# Patient Record
Sex: FEMALE | Race: WHITE | NOT HISPANIC OR LATINO | Employment: FULL TIME | URBAN - METROPOLITAN AREA
[De-identification: names, ages, dates, MRNs, and addresses within clinical notes are randomized per-mention and may not be internally consistent; named-entity substitution may affect disease eponyms.]

---

## 2017-04-10 ENCOUNTER — ALLSCRIPTS OFFICE VISIT (OUTPATIENT)
Dept: OTHER | Facility: OTHER | Age: 27
End: 2017-04-10

## 2017-04-11 ENCOUNTER — GENERIC CONVERSION - ENCOUNTER (OUTPATIENT)
Dept: OTHER | Facility: OTHER | Age: 27
End: 2017-04-11

## 2017-12-07 ENCOUNTER — GENERIC CONVERSION - ENCOUNTER (OUTPATIENT)
Dept: OTHER | Facility: OTHER | Age: 27
End: 2017-12-07

## 2017-12-20 ENCOUNTER — ALLSCRIPTS OFFICE VISIT (OUTPATIENT)
Dept: OTHER | Facility: OTHER | Age: 27
End: 2017-12-20
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
## 2017-12-27 LAB
ADEQUACY: (HISTORICAL): NORMAL
CLINICIAN PROVIDIED ICD 9 OR 10 (HISTORICAL): NORMAL
COMMENT (HISTORICAL): NORMAL
DIAGNOSIS (HISTORICAL): NORMAL
PERFORMED BY (HISTORICAL): NORMAL
REFLEX (HISTORICAL): NORMAL
TEST INFORMATION (HISTORICAL): NORMAL

## 2017-12-28 ENCOUNTER — GENERIC CONVERSION - ENCOUNTER (OUTPATIENT)
Dept: OTHER | Facility: OTHER | Age: 27
End: 2017-12-28

## 2018-01-12 VITALS
HEART RATE: 68 BPM | WEIGHT: 127.4 LBS | SYSTOLIC BLOOD PRESSURE: 98 MMHG | DIASTOLIC BLOOD PRESSURE: 60 MMHG | TEMPERATURE: 98.8 F | BODY MASS INDEX: 21.23 KG/M2 | HEIGHT: 65 IN
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
## 2018-01-13 NOTE — MISCELLANEOUS
Message   Date: 30 Sep 2016 10:51 AM EST, Recorded By: Peri Price For: Lizabeth Madden: Hilario Claros, Self   Phone: [phone] (Home), [phone] (Work)   Reason: Renew Medication   Patient called to report BCP Rx not at pharmacy  Called [facility] with 1 refill, escript sent for balance of rx  Active Problems    1  Birth control counseling (V25 09) (Z30 9)   2  Contraception (V25 9) (Z30 9)   3  Encounter for preconception consultation (V26 49) (Z31 69)   4  Encounter for routine gynecological examination (V72 31) (Z01 419)   5  Encounter for screening for malignant neoplasm of cervix (V76 2) (Z12 4)   6  Flu vaccine need (V04 81) (Z23)   7  History of self breast exam   8  Need for diphtheria-tetanus-pertussis (Tdap) vaccine (V06 1) (Z23)   9  Nicotine dependence (305 1) (F17 200)   10  Shingles (053 9) (B02 9)    Allergies    1   No Known Drug Allergies    Plan  Contraception    · Norgestim-Eth Estrad Triphasic 0 18/0 215/0 25 MG-25 MCG Oral Tablet (Ortho  Tri-Cyclen Lo); TAKE 1 TABLET DAILY    Signatures   Electronically signed by : Sofía Lee, ; Sep 30 2016 10:52AM EST                       (Author)

## 2018-01-17 NOTE — MISCELLANEOUS
Message   Recorded as Task   Date: 09/30/2016 12:32 PM, Created By: Nikole El   Task Name: Med Renewal Request   Assigned To: Kenia Regan   Regarding Patient: Arnaldo Houston, Status: Active   CommentLuise Paci - 30 Sep 2016 12:32 PM     TASK CREATED  Patient called for BCP refill  States she is on generic Sprintec  Last office visit says Demetria Zamora  Pharmacist called and said patient is on generic Sprintec  I authorized 1 pack since patient needed it for this weekend  Is it ok to send you an escript for Sprintec for the balance of the year? Neal Guadalupe - 02 Oct 2016 6:02 PM     TASK REPLIED TO: Previously Assigned To Neal Guadalupe                      ok with me   Escript sent  Active Problems    1  Birth control counseling (V25 09) (Z30 9)   2  Contraception (V25 9) (Z30 9)   3  Encounter for preconception consultation (V26 49) (Z31 69)   4  Encounter for routine gynecological examination (V72 31) (Z01 419)   5  Encounter for screening for malignant neoplasm of cervix (V76 2) (Z12 4)   6  Flu vaccine need (V04 81) (Z23)   7  History of self breast exam   8  Need for diphtheria-tetanus-pertussis (Tdap) vaccine (V06 1) (Z23)   9  Nicotine dependence (305 1) (F17 200)   10  Shingles (053 9) (B02 9)    Current Meds   1  Sprintec 28 0 25-35 MG-MCG Oral Tablet; TAKE 1 TABLET DAILY AS DIRECTED; Therapy: 63ULV8423 to (Evaluate:75Sqz2573)  Requested for: 63TZM4060; Last   Rx:03Oct2016 Ordered    Allergies    1   No Known Drug Allergies    Signatures   Electronically signed by : Bruce Madrigal, ; Oct  3 2016  3:58PM EST                       (Author)

## 2018-01-22 VITALS
WEIGHT: 130.5 LBS | HEIGHT: 65 IN | SYSTOLIC BLOOD PRESSURE: 120 MMHG | BODY MASS INDEX: 21.74 KG/M2 | DIASTOLIC BLOOD PRESSURE: 72 MMHG
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
## 2019-01-06 PROBLEM — T23.239A: Status: ACTIVE | Noted: 2017-04-10

## 2019-01-06 RX ORDER — NORGESTIMATE AND ETHINYL ESTRADIOL 0.25-0.035
1 KIT ORAL DAILY
COMMUNITY
Start: 2016-10-03 | End: 2019-01-28 | Stop reason: SDUPTHER

## 2019-01-07 ENCOUNTER — OFFICE VISIT (OUTPATIENT)
Dept: FAMILY MEDICINE CLINIC | Facility: HOSPITAL | Age: 29
End: 2019-01-07
Payer: COMMERCIAL

## 2019-01-07 VITALS
HEART RATE: 75 BPM | TEMPERATURE: 97.6 F | BODY MASS INDEX: 22.02 KG/M2 | DIASTOLIC BLOOD PRESSURE: 78 MMHG | SYSTOLIC BLOOD PRESSURE: 122 MMHG | HEIGHT: 64 IN | WEIGHT: 129 LBS

## 2019-01-07 DIAGNOSIS — R53.83 FATIGUE, UNSPECIFIED TYPE: ICD-10-CM

## 2019-01-07 DIAGNOSIS — R94.6 ABNORMAL FINDING ON EXAMINATION OF THYROID GLAND: ICD-10-CM

## 2019-01-07 DIAGNOSIS — N92.0 MENORRHAGIA WITH REGULAR CYCLE: ICD-10-CM

## 2019-01-07 DIAGNOSIS — I87.2 VENOUS INSUFFICIENCY OF LEFT LOWER EXTREMITY: Primary | ICD-10-CM

## 2019-01-07 PROCEDURE — 99213 OFFICE O/P EST LOW 20 MIN: CPT | Performed by: FAMILY MEDICINE

## 2019-01-07 PROCEDURE — 3008F BODY MASS INDEX DOCD: CPT | Performed by: FAMILY MEDICINE

## 2019-01-07 NOTE — PROGRESS NOTES
Assessment/Plan:         Diagnoses and all orders for this visit:    Venous insufficiency of left lower extremity  -     VAS lower limb venous duplex study, complete bilateral; Future    Menorrhagia with regular cycle  -     Cancel: CBC and differential; Future  -     Cancel: Comprehensive metabolic panel; Future  -     CBC and differential; Future  -     CBC and differential    Abnormal finding on examination of thyroid gland  -     Cancel: TSH, 3rd generation; Future  -     TSH, 3rd generation; Future  -     TSH, 3rd generation    Fatigue, unspecified type  -     Cancel: Vitamin B12; Future  -     Cancel: Magnesium; Future  -     Comprehensive metabolic panel; Future  -     Vitamin B12; Future  -     Magnesium; Future  -     Comprehensive metabolic panel  -     Vitamin B12  -     Magnesium          Subjective:      Patient ID: Annel Morrison is a 33 y o  female  Uncomfortable swelling in veins of LLE, worse with extended time on her feet  Works long hours in oral surgeons office  Heavy periods despite use of Sprintec        The following portions of the patient's history were reviewed and updated as appropriate: allergies, current medications, past family history, past medical history, past social history, past surgical history and problem list     Review of Systems   Cardiovascular: Positive for leg swelling  Musculoskeletal: Positive for myalgias  Neurological: Negative  Hematological: Negative  All other systems reviewed and are negative  Objective:      /78   Pulse 75   Temp 97 6 °F (36 4 °C)   Ht 5' 4" (1 626 m)   Wt 58 5 kg (129 lb)   BMI 22 14 kg/m²          Physical Exam   Constitutional: She is oriented to person, place, and time  Neck: Carotid bruit is not present  Thyromegaly present  Musculoskeletal:   Venous varicosities posterior LLE popliteal   Neurological: She is oriented to person, place, and time

## 2019-01-10 LAB
ALBUMIN SERPL-MCNC: 4.6 G/DL (ref 3.5–5.5)
ALBUMIN/GLOB SERPL: 1.6 {RATIO} (ref 1.2–2.2)
ALP SERPL-CCNC: 59 IU/L (ref 39–117)
ALT SERPL-CCNC: 10 IU/L (ref 0–32)
AST SERPL-CCNC: 15 IU/L (ref 0–40)
BASOPHILS # BLD AUTO: 0 X10E3/UL (ref 0–0.2)
BASOPHILS NFR BLD AUTO: 0 %
BILIRUB SERPL-MCNC: 0.8 MG/DL (ref 0–1.2)
BUN SERPL-MCNC: 10 MG/DL (ref 6–20)
BUN/CREAT SERPL: 11 (ref 9–23)
CALCIUM SERPL-MCNC: 9.6 MG/DL (ref 8.7–10.2)
CHLORIDE SERPL-SCNC: 100 MMOL/L (ref 96–106)
CO2 SERPL-SCNC: 22 MMOL/L (ref 20–29)
CREAT SERPL-MCNC: 0.87 MG/DL (ref 0.57–1)
EOSINOPHIL # BLD AUTO: 0.2 X10E3/UL (ref 0–0.4)
EOSINOPHIL NFR BLD AUTO: 3 %
ERYTHROCYTE [DISTWIDTH] IN BLOOD BY AUTOMATED COUNT: 13.3 % (ref 12.3–15.4)
GLOBULIN SER-MCNC: 2.8 G/DL (ref 1.5–4.5)
GLUCOSE SERPL-MCNC: 82 MG/DL (ref 65–99)
HCT VFR BLD AUTO: 39.2 % (ref 34–46.6)
HGB BLD-MCNC: 12.9 G/DL (ref 11.1–15.9)
IMM GRANULOCYTES # BLD: 0 X10E3/UL (ref 0–0.1)
IMM GRANULOCYTES NFR BLD: 0 %
LYMPHOCYTES # BLD AUTO: 2.3 X10E3/UL (ref 0.7–3.1)
LYMPHOCYTES NFR BLD AUTO: 42 %
MAGNESIUM SERPL-MCNC: 1.9 MG/DL (ref 1.6–2.3)
MCH RBC QN AUTO: 31.5 PG (ref 26.6–33)
MCHC RBC AUTO-ENTMCNC: 32.9 G/DL (ref 31.5–35.7)
MCV RBC AUTO: 96 FL (ref 79–97)
MONOCYTES # BLD AUTO: 0.3 X10E3/UL (ref 0.1–0.9)
MONOCYTES NFR BLD AUTO: 5 %
NEUTROPHILS # BLD AUTO: 2.8 X10E3/UL (ref 1.4–7)
NEUTROPHILS NFR BLD AUTO: 50 %
PLATELET # BLD AUTO: 306 X10E3/UL (ref 150–379)
POTASSIUM SERPL-SCNC: 4.3 MMOL/L (ref 3.5–5.2)
PROT SERPL-MCNC: 7.4 G/DL (ref 6–8.5)
RBC # BLD AUTO: 4.09 X10E6/UL (ref 3.77–5.28)
SL AMB EGFR AFRICAN AMERICAN: 104 ML/MIN/1.73
SL AMB EGFR NON AFRICAN AMERICAN: 90 ML/MIN/1.73
SODIUM SERPL-SCNC: 138 MMOL/L (ref 134–144)
TSH SERPL DL<=0.005 MIU/L-ACNC: 1.35 UIU/ML (ref 0.45–4.5)
VIT B12 SERPL-MCNC: 441 PG/ML (ref 232–1245)
WBC # BLD AUTO: 5.5 X10E3/UL (ref 3.4–10.8)

## 2019-01-16 ENCOUNTER — HOSPITAL ENCOUNTER (OUTPATIENT)
Dept: NON INVASIVE DIAGNOSTICS | Facility: HOSPITAL | Age: 29
Discharge: HOME | End: 2019-01-16
Payer: COMMERCIAL

## 2019-01-16 DIAGNOSIS — I87.2 VENOUS INSUFFICIENCY OF LEFT LOWER EXTREMITY: ICD-10-CM

## 2019-01-16 PROCEDURE — 93970 EXTREMITY STUDY: CPT

## 2019-01-16 PROCEDURE — 93970 EXTREMITY STUDY: CPT | Performed by: SURGERY

## 2019-01-28 DIAGNOSIS — Z30.41 ENCOUNTER FOR SURVEILLANCE OF CONTRACEPTIVE PILLS: Primary | ICD-10-CM

## 2019-01-28 RX ORDER — NORGESTIMATE AND ETHINYL ESTRADIOL 0.25-0.035
1 KIT ORAL DAILY
Qty: 28 TABLET | Refills: 1 | Status: SHIPPED | OUTPATIENT
Start: 2019-01-28 | End: 2019-02-03 | Stop reason: SDUPTHER

## 2019-01-28 NOTE — TELEPHONE ENCOUNTER
----- Message from Eliecer Mckeon sent at 1/28/2019 12:57 PM EST -----  Hi the patient mentioned above would like a refill on her birth control pill sprintec  The [facility]  She is scheduled to see Dr Bo Patel on 02/20/2019  Thank you

## 2019-02-03 DIAGNOSIS — Z30.41 ENCOUNTER FOR SURVEILLANCE OF CONTRACEPTIVE PILLS: ICD-10-CM

## 2019-02-20 ENCOUNTER — ANNUAL EXAM (OUTPATIENT)
Dept: OBGYN CLINIC | Facility: CLINIC | Age: 29
End: 2019-02-20
Payer: COMMERCIAL

## 2019-02-20 VITALS
DIASTOLIC BLOOD PRESSURE: 72 MMHG | HEIGHT: 65 IN | WEIGHT: 126.6 LBS | SYSTOLIC BLOOD PRESSURE: 112 MMHG | BODY MASS INDEX: 21.09 KG/M2

## 2019-02-20 DIAGNOSIS — Z01.419 WOMEN'S ANNUAL ROUTINE GYNECOLOGICAL EXAMINATION: Primary | ICD-10-CM

## 2019-02-20 DIAGNOSIS — Z30.41 ENCOUNTER FOR SURVEILLANCE OF CONTRACEPTIVE PILLS: ICD-10-CM

## 2019-02-20 PROCEDURE — 99395 PREV VISIT EST AGE 18-39: CPT | Performed by: OBSTETRICS & GYNECOLOGY

## 2019-02-20 RX ORDER — NORGESTIMATE AND ETHINYL ESTRADIOL 0.25-0.035
1 KIT ORAL DAILY
Qty: 84 TABLET | Refills: 3 | Status: SHIPPED | OUTPATIENT
Start: 2019-02-20 | End: 2020-01-25

## 2019-02-20 NOTE — PROGRESS NOTES
Assessment/Plan   Diagnoses and all orders for this visit:    Women's annual routine gynecological examination    Encounter for surveillance of contraceptive pills  -     norgestimate-ethinyl estradiol ([id number] [id number]) 0 25-35 MG-MCG per tablet; Take 1 tablet by mouth daily     1  Yearly exam-Pap smear deferred, self-breast awareness reviewed  2  Contraception-patient is doing well on Sprintec  She wishes to continue with this  Electronic prescription for 3 packs refill 3 was sent to [facility] pharmacy  3  Family history of breast cancer-patient mother and grandmother diagnosed with breast cancer  Her mother was diagnosed in her 46s  They both go to the same oncologist and are stable without clinical recurrence  Mom was tested and had BRCA testing which was reportedly negative  Would recommend start mammograms age 35  She will continue with self-breast exams and call with any issues  4  Thyroid-patient does have top-normal thyroid  She continues to follow-up with her doctor  Recent TSH was normal January 2019   5  Other-patient considering a pregnancy in the next few years  She will contact me with any pre conceptual concerns  Otherwise, she will follow up in 1 year or as needed  Subjective   Patient ID: Raheem Strickland is a 33 y o  female  Vitals:    02/20/19 0920   BP: 112/72     Patient was seen today for yearly exam   Please see assessment plan for details        The following portions of the patient's history were reviewed and updated as appropriate: allergies, current medications, past family history, past medical history, past social history, past surgical history and problem list   Past Medical History:   Diagnosis Date    Abnormal finding on examination of thyroid gland     Resolved 10/7/2015     Abnormal Pap smear of cervix     Fibroadenoma of breast     Menorrhagia     Resolved 8/29/2016     Methicillin resistant Staphylococcus aureus infection     Mild cervical dysplasia      Past Surgical History:   Procedure Laterality Date    BREAST SURGERY Right     Lumpectomy     COLPOSCOPY W/ BIOPSY / CURETTAGE  2007    FIDENCIO-1 noted at 12, 5, and 7 o'clock with the ECC negative  Repeat colposcopy 2009 with FIDENCIO-1 at 12 oclock, and negative biopsies at 2 and 6 o'clock and ECC       TOOTH EXTRACTION       OB History    Para Term  AB Living   1 1 1     1   SAB TAB Ectopic Multiple Live Births           1      # Outcome Date GA Lbr Ar/2nd Weight Sex Delivery Anes PTL Lv   1 Term                Current Outpatient Medications:     norgestimate-ethinyl estradiol (SPRINTEC 28) 0 25-35 MG-MCG per tablet, Take 1 tablet by mouth daily, Disp: 84 tablet, Rfl: 3  No Known Allergies  Social History     Socioeconomic History    Marital status: Single     Spouse name: None    Number of children: 1    Years of education: None    Highest education level: None   Occupational History    None   Social Needs    Financial resource strain: None    Food insecurity:     Worry: None     Inability: None    Transportation needs:     Medical: None     Non-medical: None   Tobacco Use    Smoking status: Former Smoker    Smokeless tobacco: Current User   Substance and Sexual Activity    Alcohol use: Yes     Comment: Socially     Drug use: No    Sexual activity: Yes     Birth control/protection: OCP   Lifestyle    Physical activity:     Days per week: None     Minutes per session: None    Stress: None   Relationships    Social connections:     Talks on phone: None     Gets together: None     Attends Orthodox service: None     Active member of club or organization: None     Attends meetings of clubs or organizations: None     Relationship status: None    Intimate partner violence:     Fear of current or ex partner: None     Emotionally abused: None     Physically abused: None     Forced sexual activity: None   Other Topics Concern    None   Social History Narrative    Always uses seat belt    Caffeine use    [address] ([employer])     Family History   Problem Relation Age of Onset    Breast cancer Mother     Breast cancer Maternal Grandmother     Heart disease Maternal Grandfather     Diabetes Maternal Grandfather     Lung cancer Paternal Grandfather     Osteoporosis Family     Rectal cancer Family        Review of Systems   Constitutional: Negative for chills, diaphoresis, fatigue and fever  Respiratory: Negative for apnea, cough, chest tightness, shortness of breath and wheezing  Cardiovascular: Negative for chest pain, palpitations and leg swelling  Gastrointestinal: Negative for abdominal distention, abdominal pain, anal bleeding, constipation, diarrhea, nausea, rectal pain and vomiting  Genitourinary: Negative for difficulty urinating, dyspareunia, dysuria, frequency, hematuria, menstrual problem, pelvic pain, urgency, vaginal bleeding, vaginal discharge and vaginal pain  Musculoskeletal: Negative for arthralgias, back pain and myalgias  Skin: Negative for color change and rash  Neurological: Negative for dizziness, syncope, light-headedness, numbness and headaches  Hematological: Negative for adenopathy  Does not bruise/bleed easily  Psychiatric/Behavioral: Negative for dysphoric mood and sleep disturbance  The patient is not nervous/anxious  Objective   Physical Exam    Objective      /72 (BP Location: Left arm, Patient Position: Sitting, Cuff Size: Standard)   Ht 5' 5" (1 651 m)   Wt 57 4 kg (126 lb 9 6 oz)   LMP 02/03/2019   BMI 21 07 kg/m²     General:   alert and oriented, in no acute distress   Neck: normal to inspection and palpation   Breast: normal appearance, no masses or tenderness   Heart:    Lungs:    Abdomen: soft, non-tender, without masses or organomegaly   Vulva: normal   Vagina: Without erythema or lesions or discharge  Normal   Cervix: Without lesions or discharge or cervicitis    No Cervical motion tenderness   Uterus: top normal size, anteverted, non-tender   Adnexa: no mass, fullness, tenderness   Rectum: negative    Psych:  Normal mood and affect   Skin:  Without obvious lesions   Eyes: symmetric, with normal movements and reactivity   Musculoskeletal:  Normal muscle tone and movements appreciated

## 2019-02-20 NOTE — PATIENT INSTRUCTIONS
Birth Control Pills   WHAT YOU NEED TO KNOW:   What are birth control pills? Birth control pills are also called oral contraceptives, or the pill  It is medicine that helps prevent pregnancy  Birth control pills work by preventing ovulation  Ovulation is when the ovaries make and release an egg cell each month  If this egg gets fertilized by sperm, pregnancy occurs  Birth control pills may also help to prevent pregnancy by keeping sperm from fertilizing an egg  What may be done before I can start taking birth control pills? You need to see your healthcare provider to get a prescription  Any of the following may be done before your healthcare provider gives you a prescription:  · Your healthcare provider will ask you about diseases and illnesses you have had in the past  He will check your risk for blood clots, heart conditions, or stroke  He will also check your blood pressure, and may do a breast and pelvic exam  A Pap smear may also be done during the pelvic exam  This is a test to make sure you do not have abnormal changes on your cervix  You may need other tests, such as a urine test, to make sure you are not pregnant  · Your healthcare provider will ask if you take any medicines and if you smoke  Smoking increases your risk for stroke, heart attack, or a blood clot in your lungs  If you smoke, you should not take certain kinds of birth control pills  What are the advantages of birth control pills? When birth control pills are used correctly, the chances of getting pregnant are very low  Birth control pills may help decrease bleeding and pain during your monthly period  They may also help prevent cancer of the uterus and ovaries  What are the disadvantages of birth control pills? You may have sudden changes in your mood or feelings while you take birth control pills  You may have nausea and decreased sex drive  You may have an increased appetite and rapid weight gain   You may also have bleeding in between periods, less frequent periods, vaginal dryness, and breast pain  Birth control pills will not protect you from sexually transmitted infections  Rarely, some birth control pills can increase your risk for a blood clot  This may become life-threatening  What should I do if I decide I want to get pregnant? If you are planning to have a baby, ask your healthcare provider when you may stop taking your birth control pills  It may take some time for you to start ovulating again  Ask your healthcare provider for more information about pregnancy after birth control pills  When should I start taking birth control pills after I have a baby? If you are not breastfeeding, you may start taking birth control pills 3 weeks after you give birth  You may be able to take certain types of birth control pills if you are breastfeeding  These pills can be started from 6 weeks to 6 months after you give birth  Ask your healthcare provider for more information about when to start taking birth control pills after you give birth  When should I contact my healthcare provider? · You have forgotten to take a birth control pill  · You have mood changes, such as depression, since starting birth control pills  · You have nausea or you are vomiting  · You have severe abdominal pain  · You missed a period and have questions or concerns about being pregnant  · You still have bleeding 4 months after taking birth control pills correctly  · You have questions or concerns about your condition or care  When should I seek immediate care or call 911? · Your arm or leg feels warm, tender, and painful  It may look swollen and red  · You feel lightheaded, short of breath, and have chest pain  · You cough up blood      · You have any of the following signs of a stroke:      ¨ Numbness or drooping on one side of your face     ¨ Weakness in an arm or leg    ¨ Confusion or difficulty speaking    ¨ Dizziness, a severe headache, or vision loss    · You have severe pain, numbness, or swelling in your arms or legs  CARE AGREEMENT:   You have the right to help plan your care  Learn about your health condition and how it may be treated  Discuss treatment options with your caregivers to decide what care you want to receive  You always have the right to refuse treatment  The above information is an  only  It is not intended as medical advice for individual conditions or treatments  Talk to your doctor, nurse or pharmacist before following any medical regimen to see if it is safe and effective for you  © 2017 [facility] Information is for End User's use only and may not be sold, redistributed or otherwise used for commercial purposes  All illustrations and images included in CareNotes® are the copyrighted property of A D A M [employer] Inc  or Devin Herrera

## 2019-07-23 ENCOUNTER — OFFICE VISIT (OUTPATIENT)
Dept: FAMILY MEDICINE CLINIC | Facility: HOSPITAL | Age: 29
End: 2019-07-23
Payer: COMMERCIAL

## 2019-07-23 VITALS
DIASTOLIC BLOOD PRESSURE: 60 MMHG | TEMPERATURE: 97.6 F | SYSTOLIC BLOOD PRESSURE: 114 MMHG | HEART RATE: 76 BPM | HEIGHT: 65 IN | BODY MASS INDEX: 21.29 KG/M2 | WEIGHT: 127.8 LBS

## 2019-07-23 DIAGNOSIS — B02.9 HERPES ZOSTER WITHOUT COMPLICATION: Primary | ICD-10-CM

## 2019-07-23 PROCEDURE — 1036F TOBACCO NON-USER: CPT | Performed by: NURSE PRACTITIONER

## 2019-07-23 PROCEDURE — 3008F BODY MASS INDEX DOCD: CPT | Performed by: NURSE PRACTITIONER

## 2019-07-23 PROCEDURE — 99213 OFFICE O/P EST LOW 20 MIN: CPT | Performed by: NURSE PRACTITIONER

## 2019-07-23 RX ORDER — VALACYCLOVIR HYDROCHLORIDE 1 G/1
1000 TABLET, FILM COATED ORAL 2 TIMES DAILY
Qty: 14 TABLET | Refills: 0 | Status: SHIPPED | OUTPATIENT
Start: 2019-07-23 | End: 2019-07-30

## 2019-07-23 NOTE — PROGRESS NOTES
Assessment/Plan:   Unlikely poisson ivy given non itchy  Pain symptoms consistent with shingles and given history will treat as such  Instructed to call with increasing erythema, fever,chills  Diagnoses and all orders for this visit:    Herpes zoster without complication  -     valACYclovir (VALTREX) 1,000 mg tablet; Take 1 tablet (1,000 mg total) by mouth 2 (two) times a day for 7 days          Subjective:     Patient ID: Monroe Kulkarni is a 26 y o  female  Rash on right low leg started 3 days ago  Had swelling  Not itchy  Burning and tingling that goes down foot and up calf  [place]  No fever, chills  Had shingles a few years ago and pain is similar  Does not recall any bug bite  Review of Systems   Constitutional: Negative for chills and fever  Cardiovascular: Positive for leg swelling  Skin: Positive for rash  The following portions of the patient's history were reviewed and updated as appropriate: allergies, current medications, past family history, past medical history, past social history, past surgical history and problem list     Objective:  Vitals:    07/23/19 0731   BP: 114/60   Pulse: 76   Temp: 97 6 °F (36 4 °C)      Physical Exam   Constitutional: She appears well-developed and well-nourished  Cardiovascular: Normal rate, regular rhythm and normal heart sounds  Pulmonary/Chest: Effort normal and breath sounds normal    Skin: Skin is warm and dry  Rash noted  Right posterior low leg with 1 cm red papule w/o surrounding erythema  Not vesicular  Some tenderness to touch  No swelling noted  Psychiatric: She has a normal mood and affect

## 2019-09-25 ENCOUNTER — OFFICE VISIT (OUTPATIENT)
Dept: OBGYN CLINIC | Facility: CLINIC | Age: 29
End: 2019-09-25
Payer: COMMERCIAL

## 2019-09-25 VITALS — BODY MASS INDEX: 21.13 KG/M2 | SYSTOLIC BLOOD PRESSURE: 128 MMHG | WEIGHT: 127 LBS | DIASTOLIC BLOOD PRESSURE: 84 MMHG

## 2019-09-25 DIAGNOSIS — D24.1 FIBROADENOMA OF RIGHT BREAST: Primary | ICD-10-CM

## 2019-09-25 DIAGNOSIS — Z30.41 ENCOUNTER FOR SURVEILLANCE OF CONTRACEPTIVE PILLS: ICD-10-CM

## 2019-09-25 DIAGNOSIS — N63.25 BREAST LUMP ON LEFT SIDE AT 6 O'CLOCK POSITION: ICD-10-CM

## 2019-09-25 PROCEDURE — 99214 OFFICE O/P EST MOD 30 MIN: CPT | Performed by: OBSTETRICS & GYNECOLOGY

## 2019-09-25 NOTE — PATIENT INSTRUCTIONS
Breast Mass   WHAT YOU NEED TO KNOW:   What do I need to know about a breast mass? A breast mass is a lump or growth in your breast or underarm  A cyst (fluid-filled pocket), an injury, or changes in your breast tissue are the most common causes  A breast mass can also be caused by cancer  You must follow up as directed to find the cause of your breast mass  How is a breast mass evaluated? Your healthcare provider will perform a breast exam to feel the mass  Tell him when you noticed the breast mass, and if it has changed in size  Tell him if you have any other symptoms, such as pain, fever, or nipple discharge  He will ask if you have a personal or family history of breast disease or cancer  He will also ask if you had an injury, biopsy, or surgery on your breast   · Aspiration  is a procedure used to withdraw fluid or cells from your breast mass to be tested for cancer  · A mammogram  is an x-ray to closely examine your breast mass  Healthcare providers will also look for other lumps or tissue changes in your breast      · An ultrasound  uses sound waves to look for a cyst or tumor  Why is it important to continue breast self-exams? Check your breast for changes in size, shape, or feel of the breast tissue  Check under your arms and all around your breasts  If you have monthly periods, examine your breasts after your period is over  Contact your healthcare provider if you notice any changes in your breasts  If you have questions, ask for more information on how to do a breast self-exam         When should I contact my healthcare provider? · Your breast mass returns or grows larger  · You have pain in your breast or underarm  · You have nipple discharge  · You notice other changes to your breasts or nipples, such as dimpling or a rash  · You had an aspiration and you have redness, pain, swelling, or warmth near the aspiration site  · You have a fever       · You have questions or concerns about your condition or care  CARE AGREEMENT:   You have the right to help plan your care  Learn about your health condition and how it may be treated  Discuss treatment options with your caregivers to decide what care you want to receive  You always have the right to refuse treatment  The above information is an  only  It is not intended as medical advice for individual conditions or treatments  Talk to your doctor, nurse or pharmacist before following any medical regimen to see if it is safe and effective for you  © 2017 [address] Information is for End User's use only and may not be sold, redistributed or otherwise used for commercial purposes  All illustrations and images included in CareNotes® are the copyrighted property of [employer]  or Devin Herrera

## 2019-09-25 NOTE — PROGRESS NOTES
Assessment/Plan   Diagnoses and all orders for this visit:    Fibroadenoma of right breast  -     Mammo diagnostic bilateral w cad; Future  -     US BREAST BILATERAL LIMITED (DIAGNOSTIC); Future    Breast lump on left side at 6 o'clock position  -     Mammo diagnostic bilateral w cad; Future  -     US BREAST BILATERAL LIMITED (DIAGNOSTIC); Future     1  Left breast lump-patient noted this yesterday evening  On exam, 1 cm fullness noted at 6 o'clock left breast, approximately 2 cm above the inferior border of the breast   It is mobile, well-circumscribed, nontender without any erythema or warmth or discharge noted  The patient was concerned about this given strong family history and wish to have aggressive evaluation  She was scheduled for diagnostic mammogram/ultrasound  She will follow-up in 1 month time for breast check  2  History of right breast fibroadenoma-noted at 8 o'clock in January 2011  She is status post excision by Dr Radha Rubin at [facility] with benign findings  Given this history, she will have bilateral diagnostic mammogram with bilateral ultrasound  3  Family history of breast cancer-mother diagnosed in her 46s with negative genetic testing by report  Grandmother diagnosed in her [de-identified]  Given all this, we will proceed with imaging as noted above  4  Top-normal thyroid-patient continues to have follow-up with treating doctor as needed  5  Contraception-continue Sprintec  Follow-up 1 month time for breast check or as needed  Subjective   Patient ID: Nolvia Winslow is a 26 y o  female  Vitals:    09/25/19 1243   BP: 128/84     Patient was seen today for breast check  Please see assessment plan for details        The following portions of the patient's history were reviewed and updated as appropriate: allergies, current medications, past family history, past medical history, past social history, past surgical history and problem list   Past Medical History:   Diagnosis Date  Abnormal finding on examination of thyroid gland     Resolved 10/7/2015     Abnormal Pap smear of cervix     Fibroadenoma of breast     Menorrhagia     Resolved 2016     Methicillin resistant Staphylococcus aureus infection     Mild cervical dysplasia      Past Surgical History:   Procedure Laterality Date    BREAST SURGERY Right     Lumpectomy     COLPOSCOPY W/ BIOPSY / CURETTAGE  2007    FIDENCIO-1 noted at 12, 5, and 7 o'clock with the ECC negative  Repeat colposcopy 2009 with FIDENCIO-1 at 12 oclock, and negative biopsies at 2 and 6 o'clock and ECC       TOOTH EXTRACTION       OB History    Para Term  AB Living   1 1 1     1   SAB TAB Ectopic Multiple Live Births           1      # Outcome Date GA Lbr Ar/2nd Weight Sex Delivery Anes PTL Lv   1 Term                Current Outpatient Medications:     norgestimate-ethinyl estradiol (SPRINTEC 28) 0 25-35 MG-MCG per tablet, Take 1 tablet by mouth daily, Disp: 84 tablet, Rfl: 3    valACYclovir (VALTREX) 1,000 mg tablet, Take 1 tablet (1,000 mg total) by mouth 2 (two) times a day for 7 days, Disp: 14 tablet, Rfl: 0  No Known Allergies  Social History     Socioeconomic History    Marital status: Single     Spouse name: None    Number of children: 1    Years of education: None    Highest education level: None   Occupational History    None   Social Needs    Financial resource strain: None    Food insecurity:     Worry: None     Inability: None    Transportation needs:     Medical: None     Non-medical: None   Tobacco Use    Smoking status: Former Smoker    Smokeless tobacco: Former User   Substance and Sexual Activity    Alcohol use: Yes     Comment: Socially     Drug use: No    Sexual activity: Yes     Birth control/protection: OCP   Lifestyle    Physical activity:     Days per week: None     Minutes per session: None    Stress: None   Relationships    Social connections:     Talks on phone: None     Gets together: None Attends Religion service: None     Active member of club or organization: None     Attends meetings of clubs or organizations: None     Relationship status: None    Intimate partner violence:     Fear of current or ex partner: None     Emotionally abused: None     Physically abused: None     Forced sexual activity: None   Other Topics Concern    None   Social History Narrative    Always uses seat belt    Caffeine use    [employer] ([employer])     Family History   Problem Relation Age of Onset    Breast cancer Mother     Breast cancer Maternal Grandmother     Heart disease Maternal Grandfather     Diabetes Maternal Grandfather     Lung cancer Paternal Grandfather     Osteoporosis Family     Rectal cancer Family        Review of Systems   Constitutional: Negative for chills, diaphoresis, fatigue and fever  Respiratory: Negative for apnea, cough, chest tightness, shortness of breath and wheezing  Cardiovascular: Negative for chest pain, palpitations and leg swelling  Gastrointestinal: Negative for abdominal distention, abdominal pain, anal bleeding, constipation, diarrhea, nausea, rectal pain and vomiting  Genitourinary: Negative for difficulty urinating, dyspareunia, dysuria, frequency, hematuria, menstrual problem, pelvic pain, urgency, vaginal bleeding, vaginal discharge and vaginal pain  Musculoskeletal: Negative for arthralgias, back pain and myalgias  Skin: Negative for color change and rash  Neurological: Negative for dizziness, syncope, light-headedness, numbness and headaches  Hematological: Negative for adenopathy  Does not bruise/bleed easily  Psychiatric/Behavioral: Negative for dysphoric mood and sleep disturbance  The patient is not nervous/anxious       Breast lump    Objective   Physical Exam    Objective      /84 (BP Location: Right arm, Patient Position: Sitting, Cuff Size: Standard)   Wt 57 6 kg (127 lb)   LMP 09/13/2019   BMI 21 13 kg/m² General:   alert and oriented, in no acute distress   Neck: normal to inspection and palpation   Breast: Fibrocystic changes noted bilaterally  Right breast without any palpable masses noted    Left breast with approximately 1 cm fullness noted at 6 o'clock   Heart:    Lungs:    Abdomen: soft, non-tender, without masses or organomegaly   Vulva:    Vagina:    Cervix:    Uterus:    Adnexa:    Rectum:     Psych:  Normal mood and affect   Skin:  Without obvious lesions   Eyes: symmetric, with normal movements and reactivity   Musculoskeletal:  Normal muscle tone and movements appreciated

## 2019-10-02 DIAGNOSIS — N63.0 BREAST LUMP: Primary | ICD-10-CM

## 2020-01-25 DIAGNOSIS — Z30.41 ENCOUNTER FOR SURVEILLANCE OF CONTRACEPTIVE PILLS: ICD-10-CM

## 2020-02-26 ENCOUNTER — ANNUAL EXAM (OUTPATIENT)
Dept: OBGYN CLINIC | Facility: CLINIC | Age: 30
End: 2020-02-26
Payer: COMMERCIAL

## 2020-02-26 VITALS
SYSTOLIC BLOOD PRESSURE: 116 MMHG | BODY MASS INDEX: 21.66 KG/M2 | WEIGHT: 130 LBS | HEIGHT: 65 IN | DIASTOLIC BLOOD PRESSURE: 78 MMHG

## 2020-02-26 DIAGNOSIS — Z12.4 SCREENING FOR CERVICAL CANCER: ICD-10-CM

## 2020-02-26 DIAGNOSIS — N63.25 BREAST LUMP ON LEFT SIDE AT 6 O'CLOCK POSITION: ICD-10-CM

## 2020-02-26 DIAGNOSIS — Z11.51 SCREENING FOR HPV (HUMAN PAPILLOMAVIRUS): ICD-10-CM

## 2020-02-26 DIAGNOSIS — Z01.419 WOMEN'S ANNUAL ROUTINE GYNECOLOGICAL EXAMINATION: Primary | ICD-10-CM

## 2020-02-26 DIAGNOSIS — N60.11 FIBROCYSTIC CHANGES OF RIGHT BREAST: ICD-10-CM

## 2020-02-26 DIAGNOSIS — Z30.41 ENCOUNTER FOR SURVEILLANCE OF CONTRACEPTIVE PILLS: ICD-10-CM

## 2020-02-26 PROCEDURE — G0145 SCR C/V CYTO,THINLAYER,RESCR: HCPCS | Performed by: OBSTETRICS & GYNECOLOGY

## 2020-02-26 PROCEDURE — 99395 PREV VISIT EST AGE 18-39: CPT | Performed by: OBSTETRICS & GYNECOLOGY

## 2020-02-26 PROCEDURE — 87624 HPV HI-RISK TYP POOLED RSLT: CPT | Performed by: OBSTETRICS & GYNECOLOGY

## 2020-02-26 PROCEDURE — 3008F BODY MASS INDEX DOCD: CPT | Performed by: OBSTETRICS & GYNECOLOGY

## 2020-02-26 RX ORDER — NORGESTIMATE AND ETHINYL ESTRADIOL 0.25-0.035
1 KIT ORAL DAILY
Qty: 84 TABLET | Refills: 3 | Status: SHIPPED | OUTPATIENT
Start: 2020-02-26 | End: 2021-03-16 | Stop reason: SDUPTHER

## 2020-02-26 NOTE — PROGRESS NOTES
Assessment/Plan   Diagnoses and all orders for this visit:    Women's annual routine gynecological examination    Screening for cervical cancer  -     Liquid-based pap, screening    Screening for HPV (human papillomavirus)  -     Liquid-based pap, screening    Breast lump on left side at 6 o'clock position  -     US BREAST BILATERAL LIMITED (DIAGNOSTIC); Future    Fibrocystic changes of right breast  -     US BREAST BILATERAL LIMITED (DIAGNOSTIC); Future    Encounter for surveillance of contraceptive pills  -     norgestimate-ethinyl estradiol (Sprintec 28) 0 25-35 MG-MCG per tablet; Take 1 tablet by mouth daily     1  Yearly exam-Pap smear done with HPV testing, self-breast awareness review  2  Contraception-patient continues Sprintec or its equivalent  Electronic prescription for 3 packs refill 3 was sent to [facility] at her request   3  Left breast lump-noted at 0600 hours  Patient seen initially 9/25/19 for this had ultrasound demonstrating 1 cm subdermal finding consistent with fibroadenoma with recommendations for follow-up in 6 months time  Request was given for bilateral breast ultrasound and this is scheduled at [facility] April 2020  Would suggest she might follow up with her breast specialist after that for further management  4  Right breast fibroadenoma-noted 0800 hours of right breast January 2011, status post excision by Dr Perez Irwin at [facility] with benign findings  Exam today with fibrocystic changes noted with no dominant mass appreciated  We will obtain right breast ultrasound in addition to the left breast ultrasound as noted above, thus bilateral breast ultrasound was ordered  5  Family history of breast cancer-mother and maternal grandmother both diagnosed with breast cancer  Mother was diagnosed in her 46s with negative genetic testing by report  Grandmother was diagnosed in her [de-identified]    6  History of top-normal thyroid-to follow-up with primary doctor as recommended by them   TSH 2019 was normal   7  Family history of rectal cancer-maternal aunt recently diagnosed with stage IV colon cancer  Her cousin, the daughter of this maternal aunt, had rectal cancer diagnosed at age 27  We again discussed genetic testing including evaluation for Sanchez syndrome  Would suggest she discuss with her family regarding this issue about testing  Otherwise, follow-up 1 year for yearly exam or as needed  Subjective   Patient ID: Paula Parson is a 26 y o  female  Vitals:    20 [phone]   BP: 116/78     Patient was seen today for yearly exam   Please see assessment plan for details  The following portions of the patient's history were reviewed and updated as appropriate: allergies, current medications, past family history, past medical history, past social history, past surgical history and problem list   Past Medical History:   Diagnosis Date    Abnormal finding on examination of thyroid gland     Resolved 10/7/2015     Abnormal Pap smear of cervix     Fibroadenoma of breast     Menorrhagia     Resolved 2016     Methicillin resistant Staphylococcus aureus infection     Mild cervical dysplasia      Past Surgical History:   Procedure Laterality Date    BREAST SURGERY Right     Lumpectomy     COLPOSCOPY W/ BIOPSY / CURETTAGE  2007    FIDENCIO-1 noted at 12, 5, and 7 o'clock with the ECC negative  Repeat colposcopy 2009 with FIDENCIO-1 at 12 oclock, and negative biopsies at 2 and 6 o'clock and ECC       TOOTH EXTRACTION       OB History    Para Term  AB Living   1 1 1     1   SAB TAB Ectopic Multiple Live Births           1      # Outcome Date GA Lbr Ar/2nd Weight Sex Delivery Anes PTL Lv   1 Term                Current Outpatient Medications:     SPRINTEC 28 0 25-35 MG-MCG per tablet, TAKE 1 TABLET BY MOUTH EVERY DAY, Disp: 84 tablet, Rfl: 0    valACYclovir (VALTREX) 1,000 mg tablet, Take 1 tablet (1,000 mg total) by mouth 2 (two) times a day for 7 days, Disp: 14 tablet, Rfl: 0  No Known Allergies  Social History     Socioeconomic History    Marital status: Single     Spouse name: None    Number of children: 1    Years of education: None    Highest education level: None   Occupational History    None   Social Needs    Financial resource strain: None    Food insecurity:     Worry: None     Inability: None    Transportation needs:     Medical: None     Non-medical: None   Tobacco Use    Smoking status: Former Smoker    Smokeless tobacco: Former User   Substance and Sexual Activity    Alcohol use: Yes     Comment: Socially     Drug use: No    Sexual activity: Yes     Birth control/protection: OCP   Lifestyle    Physical activity:     Days per week: None     Minutes per session: None    Stress: None   Relationships    Social connections:     Talks on phone: None     Gets together: None     Attends Church service: None     Active member of club or organization: None     Attends meetings of clubs or organizations: None     Relationship status: None    Intimate partner violence:     Fear of current or ex partner: None     Emotionally abused: None     Physically abused: None     Forced sexual activity: None   Other Topics Concern    None   Social History Narrative    Always uses seat belt    Caffeine use    [employer] ([employer])     Family History   Problem Relation Age of Onset    Breast cancer Mother     Breast cancer Maternal Grandmother     Heart disease Maternal Grandfather     Diabetes Maternal Grandfather     Lung cancer Paternal Grandfather     Osteoporosis Family     Rectal cancer Family     Colon cancer Maternal Aunt        Review of Systems   Constitutional: Negative for chills, diaphoresis, fatigue and fever  Respiratory: Negative for apnea, cough, chest tightness, shortness of breath and wheezing  Cardiovascular: Negative for chest pain, palpitations and leg swelling     Gastrointestinal: Negative for abdominal distention, abdominal pain, anal bleeding, constipation, diarrhea, nausea, rectal pain and vomiting  Genitourinary: Negative for difficulty urinating, dyspareunia, dysuria, frequency, hematuria, menstrual problem, pelvic pain, urgency, vaginal bleeding, vaginal discharge and vaginal pain  Musculoskeletal: Negative for arthralgias, back pain and myalgias  Skin: Negative for color change and rash  Neurological: Negative for dizziness, syncope, light-headedness, numbness and headaches  Hematological: Negative for adenopathy  Does not bruise/bleed easily  Psychiatric/Behavioral: Negative for dysphoric mood and sleep disturbance  The patient is not nervous/anxious  Objective   Physical Exam    Objective      /78 (BP Location: Left arm, Patient Position: Sitting, Cuff Size: Standard)   Ht 5' 5" (1 651 m)   Wt 59 kg (130 lb)   BMI 21 63 kg/m²     General:   alert and oriented, in no acute distress   Neck: normal to inspection and palpation   Breast: Left breast with 1 cm fullness noted at 0600 hours, approximately 4 cm below the areola, mobile, nontender, without erythema or warmth or discharge  Fibrocystic changes noted on the right breast without any dominant masses appreciated  Heart:    Lungs:    Abdomen: soft, non-tender, without masses or organomegaly   Vulva: normal   Vagina: Without erythema or lesions or discharge  Normal   Cervix: Without lesions or discharge or cervicitis    No Cervical motion tenderness   Uterus: top normal size, anteverted, non-tender   Adnexa: no mass, fullness, tenderness   Rectum: negative    Psych:  Normal mood and affect   Skin:  Without obvious lesions   Eyes: symmetric, with normal movements and reactivity   Musculoskeletal:  Normal muscle tone and movements appreciated

## 2020-02-26 NOTE — PATIENT INSTRUCTIONS
Fibrocystic Breast Changes   WHAT YOU NEED TO KNOW:   What are fibrocystic breast changes? Fibrocystic changes are changes in your breast tissue  Your breast tissue may have small cysts, benign lumps (not cancer), or thickened areas  These breast tissue changes are common in women who have not gone through menopause  Fibrocystic breast changes do not increase your risk of breast cancer  The cause of fibrocystic breast changes is unknown  They may be related to hormonal changes that occur during your menstrual cycle  What are other signs and symptoms of fibrocystic breast changes? Signs and symptoms may be more noticeable before your period  You may have any of the following:  · Tenderness and pain in the upper outer areas of both breasts    · Cysts that get larger and more painful before your period    · Breast swelling during your period    · Clear or cloudy nipple discharge  How are fibrocystic breast changes diagnosed? Your healthcare provider will examine your breasts  He will ask about your signs and symptoms, and about your family medical history  The provider may diagnose fibrocystic breast changes based on your signs and symptoms alone  He or she may also order certain tests to make sure you do not have breast cancer  You may need any of the following:  · A mammogram  is an x-ray to closely examine your breast tissue  Healthcare providers will also look for other lumps or tissue changes in your breast      · An ultrasound  uses sound waves to look for a cyst or tumor  · Aspiration and fine needle biopsy  is a procedure to find the cause of a breast lump  This procedure uses a small needle to collect fluid or cells from your breast  The samples are then sent to a lab  The drainage of fluid may also be done to help relieve pain  How are fibrocystic breast changes treated?   Your healthcare provider may recommend the following to relieve your symptoms:  · NSAIDs , such as ibuprofen, help decrease swelling, pain, and fever  This medicine is available with or without a doctor's order  NSAIDs can cause stomach bleeding or kidney problems in certain people  If you take blood thinner medicine, always ask your healthcare provider if NSAIDs are safe for you  Always read the medicine label and follow directions  · Oral contraceptives  (birth control pills) may be recommended by your healthcare provider  These may help to decrease the level of hormones that worsen your signs and symptoms  · Surgery  to remove a large lump or cysts that return after drainage may be done  How can I manage my symptoms? · Apply heat  on your breasts for 20 to 30 minutes every 2 hours for as many days as directed  Heat helps decrease pain and muscle spasms  · Apply ice  on your breasts for 15 to 20 minutes every hour or as directed  Use an ice pack, or put crushed ice in a plastic bag  Cover it with a towel  Ice helps prevent tissue damage and decreases swelling and pain  · Wear a well-fitted, supportive bra  It may help to relieve pain and swelling  · Limit or avoid caffeine  This may help to decrease symptoms in some women  Caffeine is found in coffee, tea, sodas, and chocolate  Why is it important to continue breast self-exams? Check your breast for changes in size, shape, or feel of the breast tissue  Check under your arms and all around your breasts  If you have monthly periods, examine your breasts after your period is over  Contact your healthcare provider if you notice any changes in your breasts  If you have questions, ask for more information about how to do a breast self-exam         When should I contact my healthcare provider? · You notice other changes in your breasts or nipples, such as dimpling or a rash  · You have bloody nipple discharge  · You have a fever  · You have questions or concerns about your condition or care  CARE AGREEMENT:   You have the right to help plan your care  Learn about your health condition and how it may be treated  Discuss treatment options with your caregivers to decide what care you want to receive  You always have the right to refuse treatment  The above information is an  only  It is not intended as medical advice for individual conditions or treatments  Talk to your doctor, nurse or pharmacist before following any medical regimen to see if it is safe and effective for you  © 2017 [employer] Information is for End User's use only and may not be sold, redistributed or otherwise used for commercial purposes  All illustrations and images included in CareNotes® are the copyrighted property of [employer]  or [employer]  Birth Control Pills   WHAT YOU NEED TO KNOW:   What are birth control pills? Birth control pills are also called oral contraceptives, or the pill  It is medicine that helps prevent pregnancy  Birth control pills work by preventing ovulation  Ovulation is when the ovaries make and release an egg cell each month  If this egg gets fertilized by sperm, pregnancy occurs  Birth control pills may also help to prevent pregnancy by keeping sperm from fertilizing an egg  What may be done before I can start taking birth control pills? You need to see your healthcare provider to get a prescription  Any of the following may be done before your healthcare provider gives you a prescription:  · Your healthcare provider will ask you about diseases and illnesses you have had in the past  He will check your risk for blood clots, heart conditions, or stroke  He will also check your blood pressure, and may do a breast and pelvic exam  A Pap smear may also be done during the pelvic exam  This is a test to make sure you do not have abnormal changes on your cervix  You may need other tests, such as a urine test, to make sure you are not pregnant       · Your healthcare provider will ask if you take any medicines and if you smoke  Smoking increases your risk for stroke, heart attack, or a blood clot in your lungs  If you smoke, you should not take certain kinds of birth control pills  What are the advantages of birth control pills? When birth control pills are used correctly, the chances of getting pregnant are very low  Birth control pills may help decrease bleeding and pain during your monthly period  They may also help prevent cancer of the uterus and ovaries  What are the disadvantages of birth control pills? You may have sudden changes in your mood or feelings while you take birth control pills  You may have nausea and decreased sex drive  You may have an increased appetite and rapid weight gain  You may also have bleeding in between periods, less frequent periods, vaginal dryness, and breast pain  Birth control pills will not protect you from sexually transmitted infections  Rarely, some birth control pills can increase your risk for a blood clot  This may become life-threatening  What should I do if I decide I want to get pregnant? If you are planning to have a baby, ask your healthcare provider when you may stop taking your birth control pills  It may take some time for you to start ovulating again  Ask your healthcare provider for more information about pregnancy after birth control pills  When should I start taking birth control pills after I have a baby? If you are not breastfeeding, you may start taking birth control pills 3 weeks after you give birth  You may be able to take certain types of birth control pills if you are breastfeeding  These pills can be started from 6 weeks to 6 months after you give birth  Ask your healthcare provider for more information about when to start taking birth control pills after you give birth  When should I contact my healthcare provider? · You have forgotten to take a birth control pill      · You have mood changes, such as depression, since starting birth control pills     · You have nausea or you are vomiting  · You have severe abdominal pain  · You missed a period and have questions or concerns about being pregnant  · You still have bleeding 4 months after taking birth control pills correctly  · You have questions or concerns about your condition or care  When should I seek immediate care or call 911? · Your arm or leg feels warm, tender, and painful  It may look swollen and red  · You feel lightheaded, short of breath, and have chest pain  · You cough up blood  · You have any of the following signs of a stroke:      ¨ Numbness or drooping on one side of your face     ¨ Weakness in an arm or leg    ¨ Confusion or difficulty speaking    ¨ Dizziness, a severe headache, or vision loss    · You have severe pain, numbness, or swelling in your arms or legs  CARE AGREEMENT:   You have the right to help plan your care  Learn about your health condition and how it may be treated  Discuss treatment options with your caregivers to decide what care you want to receive  You always have the right to refuse treatment  The above information is an  only  It is not intended as medical advice for individual conditions or treatments  Talk to your doctor, nurse or pharmacist before following any medical regimen to see if it is safe and effective for you  © 2017 [employer] Information is for End User's use only and may not be sold, redistributed or otherwise used for commercial purposes  All illustrations and images included in CareNotes® are the copyrighted property of [employer]  or [employer] Herrera

## 2020-02-27 LAB
HPV HR 12 DNA CVX QL NAA+PROBE: NEGATIVE
HPV16 DNA CVX QL NAA+PROBE: NEGATIVE
HPV18 DNA CVX QL NAA+PROBE: NEGATIVE

## 2020-03-02 LAB
LAB AP GYN PRIMARY INTERPRETATION: NORMAL
Lab: NORMAL

## 2020-04-09 DIAGNOSIS — R92.8 MAMMOGRAM ABNORMAL: Primary | ICD-10-CM

## 2020-10-26 ENCOUNTER — TELEPHONE (OUTPATIENT)
Dept: OBGYN CLINIC | Facility: CLINIC | Age: 30
End: 2020-10-26

## 2020-12-09 ENCOUNTER — TELEPHONE (OUTPATIENT)
Dept: FAMILY MEDICINE CLINIC | Facility: HOSPITAL | Age: 30
End: 2020-12-09

## 2020-12-09 DIAGNOSIS — Z20.822 EXPOSURE TO COVID-19 VIRUS: ICD-10-CM

## 2020-12-09 DIAGNOSIS — Z20.822 EXPOSURE TO COVID-19 VIRUS: Primary | ICD-10-CM

## 2020-12-09 PROCEDURE — U0003 INFECTIOUS AGENT DETECTION BY NUCLEIC ACID (DNA OR RNA); SEVERE ACUTE RESPIRATORY SYNDROME CORONAVIRUS 2 (SARS-COV-2) (CORONAVIRUS DISEASE [COVID-19]), AMPLIFIED PROBE TECHNIQUE, MAKING USE OF HIGH THROUGHPUT TECHNOLOGIES AS DESCRIBED BY CMS-2020-01-R: HCPCS | Performed by: FAMILY MEDICINE

## 2020-12-10 LAB — SARS-COV-2 RNA SPEC QL NAA+PROBE: NOT DETECTED

## 2021-03-16 DIAGNOSIS — Z30.41 ENCOUNTER FOR SURVEILLANCE OF CONTRACEPTIVE PILLS: ICD-10-CM

## 2021-03-16 RX ORDER — NORGESTIMATE AND ETHINYL ESTRADIOL 0.25-0.035
1 KIT ORAL DAILY
Qty: 84 TABLET | Refills: 0 | Status: SHIPPED | OUTPATIENT
Start: 2021-03-16 | End: 2021-03-24

## 2021-03-24 DIAGNOSIS — Z30.41 ENCOUNTER FOR SURVEILLANCE OF CONTRACEPTIVE PILLS: ICD-10-CM

## 2021-05-12 ENCOUNTER — ANNUAL EXAM (OUTPATIENT)
Dept: OBGYN CLINIC | Facility: CLINIC | Age: 31
End: 2021-05-12
Payer: COMMERCIAL

## 2021-05-12 VITALS
WEIGHT: 135 LBS | HEIGHT: 65 IN | BODY MASS INDEX: 22.49 KG/M2 | DIASTOLIC BLOOD PRESSURE: 80 MMHG | SYSTOLIC BLOOD PRESSURE: 122 MMHG

## 2021-05-12 DIAGNOSIS — Z01.419 WOMEN'S ANNUAL ROUTINE GYNECOLOGICAL EXAMINATION: Primary | ICD-10-CM

## 2021-05-12 DIAGNOSIS — D24.2 FIBROADENOMA OF BREAST, LEFT: ICD-10-CM

## 2021-05-12 DIAGNOSIS — N63.25 BREAST LUMP ON LEFT SIDE AT 6 O'CLOCK POSITION: ICD-10-CM

## 2021-05-12 DIAGNOSIS — Z30.41 ENCOUNTER FOR SURVEILLANCE OF CONTRACEPTIVE PILLS: ICD-10-CM

## 2021-05-12 PROCEDURE — 99395 PREV VISIT EST AGE 18-39: CPT | Performed by: OBSTETRICS & GYNECOLOGY

## 2021-05-12 PROCEDURE — 3008F BODY MASS INDEX DOCD: CPT | Performed by: OBSTETRICS & GYNECOLOGY

## 2021-05-12 PROCEDURE — 1036F TOBACCO NON-USER: CPT | Performed by: OBSTETRICS & GYNECOLOGY

## 2021-05-12 RX ORDER — NORGESTIMATE AND ETHINYL ESTRADIOL 0.25-0.035
1 KIT ORAL DAILY
Qty: 84 TABLET | Refills: 3 | Status: SHIPPED | OUTPATIENT
Start: 2021-05-12 | End: 2021-11-02

## 2021-05-12 NOTE — PROGRESS NOTES
Assessment/Plan   Diagnoses and all orders for this visit:    Women's annual routine gynecological examination    Breast lump on left side at 6 o'clock position  -     US breast left limited (diagnostic); Future    Fibroadenoma of breast, left  -     US breast left limited (diagnostic); Future    Encounter for surveillance of contraceptive pills  -     norgestimate-ethinyl estradiol (Sprintec 28) 0 25-35 MG-MCG per tablet; Take 1 tablet by mouth daily    1  Yearly exam-Pap smear deferred, self-breast awareness review  2  Contraception-continues on Sprintec or its equivalent  Electronic prescription for 3 packs refill 3 was sent to [facility] pharmacy at her request   3  Left breast lump/probable fibroadenoma-has 8 mm finding at 0600 hours left breast, stable from 10/19, 4/20, and 10/20 ultrasounds  Exam was notable for fullness in this area, unchanged from previous exam   We discussed fibroadenoma at some length  She has considered removal, but given that it is stable she will follow with this time  Request was given for left breast ultrasound as recommended by radiology to be done October 2021  4  History of right breast fibroadenoma-removed January 2011 by Dr Lucas Gutierrez at [facility]   No recurrence is noted  Good healing is appreciated  5  Family history of breast cancer-mother maternal grandmother both diagnosed with breast cancer, mom in her 46s with have genetic testing  Her grandmother diagnosed [de-identified]  6  History of top-normal thyroid-normal on exam today  Ultrasound was unremarkable  7   Family history of colorectal cancer-maternal aunt with advanced colon cancer  Her daughter, patient's 1st cousin, diagnosed with rectal cancer age 27  Shaun Retana is a patient of mine, no genetic findings to my knowledge  6  Other-daughter now age 15  They are going on a vacation to the [employer]  soon  Follow-up 1 year for yearly exam or as needed  Subjective   Patient ID: Cheryl Quinonez is a 31 y o  female  Vitals:    21 1104   BP: 122/80     Patient seen today for yearly exam   Please see assessment plan for details  The following portions of the patient's history were reviewed and updated as appropriate: allergies, current medications, past family history, past medical history, past social history, past surgical history and problem list   Past Medical History:   Diagnosis Date    Abnormal finding on examination of thyroid gland     Resolved 10/7/2015     Abnormal Pap smear of cervix     Fibroadenoma of breast     Menorrhagia     Resolved 2016     Methicillin resistant Staphylococcus aureus infection     Mild cervical dysplasia      Past Surgical History:   Procedure Laterality Date    BREAST SURGERY Right     Lumpectomy     COLPOSCOPY W/ BIOPSY / CURETTAGE  2007    FIDENCIO-1 noted at 12, 5, and 7 o'clock with the ECC negative  Repeat colposcopy 2009 with FIDENCIO-1 at 12 oclock, and negative biopsies at 2 and 6 o'clock and ECC       TOOTH EXTRACTION       OB History    Para Term  AB Living   1 1 1     1   SAB TAB Ectopic Multiple Live Births           1      # Outcome Date GA Lbr Ar/2nd Weight Sex Delivery Anes PTL Lv   1 Term                Current Outpatient Medications:     norgestimate-ethinyl estradiol (Sprintec 28) 0 25-35 MG-MCG per tablet, Take 1 tablet by mouth daily, Disp: 84 tablet, Rfl: 3    valACYclovir (VALTREX) 1,000 mg tablet, Take 1 tablet (1,000 mg total) by mouth 2 (two) times a day for 7 days (Patient not taking: Reported on 2021), Disp: 14 tablet, Rfl: 0  No Known Allergies  Social History     Socioeconomic History    Marital status: Single     Spouse name: None    Number of children: 1    Years of education: None    Highest education level: None   Occupational History    None   Social Needs    Financial resource strain: None    Food insecurity     Worry: None     Inability: None    Transportation needs     Medical: None Non-medical: None   Tobacco Use    Smoking status: Former Smoker    Smokeless tobacco: Former User   Substance and Sexual Activity    Alcohol use: Yes     Frequency: Monthly or less     Comment: Socially     Drug use: No    Sexual activity: Yes     Partners: Male     Birth control/protection: OCP   Lifestyle    Physical activity     Days per week: None     Minutes per session: None    Stress: None   Relationships    Social connections     Talks on phone: None     Gets together: None     Attends Scientologist service: None     Active member of club or organization: None     Attends meetings of clubs or organizations: None     Relationship status: None    Intimate partner violence     Fear of current or ex partner: None     Emotionally abused: None     Physically abused: None     Forced sexual activity: None   Other Topics Concern    None   Social History Narrative    Always uses seat belt    Caffeine use    [facility] ([address])     Family History   Problem Relation Age of Onset    Breast cancer Mother     Breast cancer Maternal Grandmother     Heart disease Maternal Grandfather     Diabetes Maternal Grandfather     Lung cancer Paternal Grandfather     Osteoporosis Family     Rectal cancer Family     Colon cancer Maternal Aunt        Review of Systems   Constitutional: Negative for chills, diaphoresis, fatigue and fever  Respiratory: Negative for apnea, cough, chest tightness, shortness of breath and wheezing  Cardiovascular: Negative for chest pain, palpitations and leg swelling  Gastrointestinal: Negative for abdominal distention, abdominal pain, anal bleeding, constipation, diarrhea, nausea, rectal pain and vomiting  Genitourinary: Negative for difficulty urinating, dyspareunia, dysuria, frequency, hematuria, menstrual problem, pelvic pain, urgency, vaginal bleeding, vaginal discharge and vaginal pain  Musculoskeletal: Negative for arthralgias, back pain and myalgias  Skin: Negative for color change and rash  Neurological: Negative for dizziness, syncope, light-headedness, numbness and headaches  Hematological: Negative for adenopathy  Does not bruise/bleed easily  Psychiatric/Behavioral: Negative for dysphoric mood and sleep disturbance  The patient is not nervous/anxious  Objective   Physical Exam    Objective      /80 (BP Location: Left arm, Patient Position: Sitting, Cuff Size: Adult)   Ht 5' 5" (1 651 m)   Wt 61 2 kg (135 lb)   LMP 04/28/2021   BMI 22 47 kg/m²     General:   alert and oriented, in no acute distress   Neck: normal to inspection and palpation   Breast: Right breast without any masses or lesions or discharge noted  Left breast with 0 75 cm fullness noted at 0600 hours, approximately 3 cm below the areola  It is without warmth, discharge, or erythema noted   Heart:    Lungs:    Abdomen: soft, non-tender, without masses or organomegaly   Vulva: normal   Vagina: Without erythema or lesions or discharge  Normal   Cervix: Without lesions or discharge or cervicitis    No Cervical motion tenderness   Uterus: top normal size, anteverted, non-tender   Adnexa: no mass, fullness, tenderness   Rectum: negative    Psych:  Normal mood and affect   Skin:  Without obvious lesions   Eyes: symmetric, with normal movements and reactivity   Musculoskeletal:  Normal muscle tone and movements appreciated

## 2021-10-20 ENCOUNTER — TELEPHONE (OUTPATIENT)
Dept: OBGYN CLINIC | Facility: CLINIC | Age: 31
End: 2021-10-20

## 2022-06-08 ENCOUNTER — ANNUAL EXAM (OUTPATIENT)
Dept: OBGYN CLINIC | Facility: CLINIC | Age: 32
End: 2022-06-08
Payer: COMMERCIAL

## 2022-06-08 VITALS
HEIGHT: 65 IN | DIASTOLIC BLOOD PRESSURE: 64 MMHG | BODY MASS INDEX: 22.66 KG/M2 | SYSTOLIC BLOOD PRESSURE: 118 MMHG | WEIGHT: 136 LBS

## 2022-06-08 DIAGNOSIS — Z12.4 CERVICAL CANCER SCREENING: Primary | ICD-10-CM

## 2022-06-08 DIAGNOSIS — Z87.410 HISTORY OF CERVICAL DYSPLASIA: ICD-10-CM

## 2022-06-08 DIAGNOSIS — Z30.41 ENCOUNTER FOR SURVEILLANCE OF CONTRACEPTIVE PILLS: ICD-10-CM

## 2022-06-08 DIAGNOSIS — Z11.51 SCREENING FOR HPV (HUMAN PAPILLOMAVIRUS): ICD-10-CM

## 2022-06-08 DIAGNOSIS — D24.2 FIBROADENOMA OF LEFT BREAST: ICD-10-CM

## 2022-06-08 DIAGNOSIS — Z01.419 WOMEN'S ANNUAL ROUTINE GYNECOLOGICAL EXAMINATION: ICD-10-CM

## 2022-06-08 PROBLEM — D24.1 FIBROADENOMA OF RIGHT BREAST: Status: RESOLVED | Noted: 2019-09-25 | Resolved: 2022-06-08

## 2022-06-08 PROCEDURE — G0476 HPV COMBO ASSAY CA SCREEN: HCPCS | Performed by: OBSTETRICS & GYNECOLOGY

## 2022-06-08 PROCEDURE — G0145 SCR C/V CYTO,THINLAYER,RESCR: HCPCS | Performed by: OBSTETRICS & GYNECOLOGY

## 2022-06-08 PROCEDURE — S0612 ANNUAL GYNECOLOGICAL EXAMINA: HCPCS | Performed by: OBSTETRICS & GYNECOLOGY

## 2022-06-08 RX ORDER — NORGESTIMATE AND ETHINYL ESTRADIOL 0.25-0.035
1 KIT ORAL DAILY
Qty: 84 TABLET | Refills: 3 | Status: SHIPPED | OUTPATIENT
Start: 2022-06-08

## 2022-06-08 NOTE — PROGRESS NOTES
Assessment/Plan   Diagnoses and all orders for this visit:    Cervical cancer screening  -     Liquid-based pap, screening    Women's annual routine gynecological examination    Screening for HPV (human papillomavirus)  -     Liquid-based pap, screening    Fibroadenoma of left breast    Encounter for surveillance of contraceptive pills  -     norgestimate-ethinyl estradiol (Rosalva) 0 25-35 MG-MCG per tablet; Take 1 tablet by mouth daily    History of cervical dysplasia    1  Yearly exam-Pap smear done with HPV testing, self-breast awareness reviewed  2  Contraception-continues on Sprintec or its equivalent  Electronic prescription for 3 packs refill 3 was sent to [facility] pharmacy at her request   3  Left breast fibroadenoma-most recent imaging 10/20/21 with 9 mm likely fibroadenoma at 0600 hours left breast   It is palpable in the patient does follow it closely  According to Radiology, it is been stable since October 2019 and they do not recommend any further imaging unless clinical change is noted  To me, was 0 75-1 cm last year, fairly stable today  Patient states that has been stable for years now as well  She was offered to go see breast specialist, did have prior right breast fibroadenoma removed by Dr Yuni Gary at [facility]  She will follow-up with her if she desires to proceed in that fashion  4  Prior right breast fibroadenoma-removed as noted above  No recurrence noted  5  Family history of breast cancer-mother and maternal grandmother both diagnosed with breast cancer  Mom had genetic testing which was negative  6  History of top-normal thyroid-was normal on exam today  7  Family history colorectal cancer-maternal and with advanced colon cancer  Her daughter, patient's 1st cousin, diagnosed with rectal cancer at age 27  Iam Frazier is a patient of mine, no genetic findings to my knowledge  Would recommend colonoscopy age 35   6  Other-daughter now age 13, going to 10th grade    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 [place]  Follow-up 1 year for yearly exam as needed  Subjective   Patient ID: Dillon Storey is a 34 y o  female  Vitals:    22 1259   BP: 118/64     HPI    The following portions of the patient's history were reviewed and updated as appropriate: allergies, current medications, past family history, past medical history, past social history, past surgical history and problem list   Past Medical History:   Diagnosis Date    Abnormal finding on examination of thyroid gland     Resolved 10/7/2015     Abnormal Pap smear of cervix     Fibroadenoma of breast     Menorrhagia     Resolved 2016     Methicillin resistant Staphylococcus aureus infection     Mild cervical dysplasia      Past Surgical History:   Procedure Laterality Date    BREAST SURGERY Right     Lumpectomy     COLPOSCOPY W/ BIOPSY / CURETTAGE  2007    FIDENCIO-1 noted at 12, 5, and 7 o'clock with the ECC negative  Repeat colposcopy 2009 with FIDENCIO-1 at 12 oclock, and negative biopsies at 2 and 6 o'clock and ECC       TOOTH EXTRACTION       OB History    Para Term  AB Living   1 1 1     1   SAB IAB Ectopic Multiple Live Births           1      # Outcome Date GA Lbr Ar/2nd Weight Sex Delivery Anes PTL Lv   1 Term                Current Outpatient Medications:     norgestimate-ethinyl estradiol (Rosalva) 0 25-35 MG-MCG per tablet, Take 1 tablet by mouth daily, Disp: 84 tablet, Rfl: 3    valACYclovir (VALTREX) 1,000 mg tablet, Take 1 tablet (1,000 mg total) by mouth 2 (two) times a day for 7 days (Patient not taking: Reported on 2021), Disp: 14 tablet, Rfl: 0  No Known Allergies  Social History     Socioeconomic History    Marital status: Single     Spouse name: None    Number of children: 1    Years of education: None    Highest education level: None   Occupational History    None   Tobacco Use    Smoking status: Former Smoker    Smokeless tobacco: Former User   Vaping Use   [address] [employer] Use: Never used   Substance and Sexual Activity    Alcohol use: Yes     Comment: Socially     Drug use: No    Sexual activity: Yes     Partners: Male     Birth control/protection: OCP   Other Topics Concern    None   Social History Narrative    Always uses seat belt    Caffeine use    [address] ([employer]     Social Determinants of Health     Financial Resource Strain: Not on file   Food Insecurity: Not on file   Transportation Needs: Not on file   Physical Activity: Not on file   Stress: Not on file   Social Connections: Not on file   Intimate Partner Violence: Not on file   Housing Stability: Not on file     Family History   Problem Relation Age of Onset    Breast cancer Mother     Breast cancer Maternal Grandmother     Heart disease Maternal Grandfather     Diabetes Maternal Grandfather     Lung cancer Paternal Grandfather     Colon cancer Maternal Aunt     Osteoporosis Family     Rectal cancer Family        Review of Systems    Objective   Physical Exam  OBGyn Exam     Objective      /64   Ht 5' 5" (1 651 m)   Wt 61 7 kg (136 lb)   LMP 05/08/2022 (Approximate)   BMI 22 63 kg/m²     General:   alert and oriented, in no acute distress   Neck: normal to inspection and palpation   Breast: Right breast without any masses or lesions or discharge noted  Left breast with 1 cm fullness noted at 0600 hours, approximately 3-4 cm below the areola border  It is mobile, nontender, without warmth or erythema or discharge  Heart:    Lungs:    Abdomen: soft, non-tender, without masses or organomegaly   Vulva: normal   Vagina: Without erythema or lesions or discharge  Normal   Cervix: Without lesions or discharge or cervicitis    No Cervical motion tenderness   Uterus: top normal size, anteverted, non-tender   Adnexa: no mass, fullness, tenderness   Rectum: negative    Psych:  Normal mood and affect   Skin:  Without obvious lesions   Eyes: symmetric, with normal movements and reactivity   Musculoskeletal:  Normal muscle tone and movements appreciated

## 2022-06-15 LAB
LAB AP GYN PRIMARY INTERPRETATION: NORMAL
LAB AP LMP: NORMAL
Lab: NORMAL

## 2022-06-27 ENCOUNTER — TELEPHONE (OUTPATIENT)
Dept: FAMILY MEDICINE CLINIC | Facility: HOSPITAL | Age: 32
End: 2022-06-27

## 2022-06-27 NOTE — LETTER
[address] [place] [address], [address] [place] [address]    July 7, 2022    Dear Jaya Rucker from our office indicate that you are a patient of Marybelle Homans, MD, with [facility], [facility]  Please call the office to establish care and schedule your annual physical today at [phone]  If you are seeing a primary care provider other than the one assigned to you by your insurance, you can simply call the number on the back of your insurance card to change your primary care physician with your insurance company  We thank you for choosing [address] for your healthcare needs      Sincerely,    [facility]

## 2022-08-08 NOTE — TELEPHONE ENCOUNTER
08/08/22 8:09 AM     Thank you for your request  Your request has been received, reviewed, and the patient chart updated  The PCP has successfully been removed with a patient attribution note  This message will now be completed      Thank you  Randall Trevino

## 2022-10-25 ENCOUNTER — RA CDI HCC (OUTPATIENT)
Dept: OTHER | Facility: HOSPITAL | Age: 32
End: 2022-10-25

## 2022-10-25 NOTE — PROGRESS NOTES
[facility]  coding opportunities       Chart reviewed, no opportunity found: CHART REVIEWED, NO OPPORTUNITY FOUND        Patients Insurance        Commercial Insurance: [address] [place] [address]

## 2022-10-26 ENCOUNTER — OFFICE VISIT (OUTPATIENT)
Dept: FAMILY MEDICINE CLINIC | Facility: HOSPITAL | Age: 32
End: 2022-10-26
Payer: COMMERCIAL

## 2022-10-26 VITALS
WEIGHT: 137.4 LBS | SYSTOLIC BLOOD PRESSURE: 118 MMHG | TEMPERATURE: 97.6 F | BODY MASS INDEX: 23.46 KG/M2 | HEART RATE: 82 BPM | DIASTOLIC BLOOD PRESSURE: 60 MMHG | HEIGHT: 64 IN

## 2022-10-26 DIAGNOSIS — Z13.220 SCREENING CHOLESTEROL LEVEL: ICD-10-CM

## 2022-10-26 DIAGNOSIS — Z13.228 SCREENING FOR ENDOCRINE, METABOLIC AND IMMUNITY DISORDER: ICD-10-CM

## 2022-10-26 DIAGNOSIS — Z00.00 ANNUAL PHYSICAL EXAM: Primary | ICD-10-CM

## 2022-10-26 DIAGNOSIS — Z11.59 ENCOUNTER FOR HEPATITIS C SCREENING TEST FOR LOW RISK PATIENT: ICD-10-CM

## 2022-10-26 DIAGNOSIS — F41.9 ANXIETY: ICD-10-CM

## 2022-10-26 DIAGNOSIS — Z11.4 SCREENING FOR HIV (HUMAN IMMUNODEFICIENCY VIRUS): ICD-10-CM

## 2022-10-26 DIAGNOSIS — Z13.29 SCREENING FOR ENDOCRINE, METABOLIC AND IMMUNITY DISORDER: ICD-10-CM

## 2022-10-26 DIAGNOSIS — Z13.0 SCREENING FOR ENDOCRINE, METABOLIC AND IMMUNITY DISORDER: ICD-10-CM

## 2022-10-26 PROBLEM — N63.25 BREAST LUMP ON LEFT SIDE AT 6 O'CLOCK POSITION: Status: RESOLVED | Noted: 2019-09-25 | Resolved: 2022-10-26

## 2022-10-26 PROBLEM — R53.83 FATIGUE: Status: RESOLVED | Noted: 2019-01-07 | Resolved: 2022-10-26

## 2022-10-26 PROCEDURE — 99385 PREV VISIT NEW AGE 18-39: CPT | Performed by: NURSE PRACTITIONER

## 2022-10-26 NOTE — PATIENT INSTRUCTIONS

## 2022-10-26 NOTE — PROGRESS NOTES
ADULT ANNUAL [address] [facility] [address]     NAME: Sonido Zheng  AGE: 34 y o  SEX: female  : 1990     DATE: 10/26/2022     Assessment and Plan:     Problem List Items Addressed This Visit    None     Visit Diagnoses     Annual physical exam    -  Primary    Anxiety        Discussed medication  Pt would like to trial alternative strategies  Recommend starting magnesium supplement  Screening cholesterol level        Relevant Orders    Lipid panel    Encounter for hepatitis C screening test for low risk patient        Relevant Orders    Hepatitis C antibody    Screening for HIV (human immunodeficiency virus)        Relevant Orders    Human Immunodeficiency Virus 1/2 Antigen / Antibody ( Fourth Generation) with Reflex Testing    Screening for endocrine, metabolic and immunity disorder        Relevant Orders    CBC and differential    Comprehensive metabolic panel          Immunizations and preventive care screenings were discussed with patient today  Appropriate education was printed on patient's after visit summary  Counseling:  Alcohol/drug use: discussed moderation in alcohol intake, the recommendations for healthy alcohol use, and avoidance of illicit drug use  Dental Health: discussed importance of regular tooth brushing, flossing, and dental visits  Injury prevention: discussed safety/seat belts, safety helmets, smoke detectors, carbon dioxide detectors, and smoking near bedding or upholstery  Sexual health: discussed sexually transmitted diseases, partner selection, use of condoms, avoidance of unintended pregnancy, and contraceptive alternatives  · Exercise: the importance of regular exercise/physical activity was discussed  Recommend exercise 3-5 times per week for at least 30 minutes  Depression Screening and Follow-up Plan: Patient was screened for depression during today's encounter   They screened negative with a PHQ-2 score of 0  Return in 1 year (on 10/26/2023)  Chief Complaint:     Chief Complaint   Patient presents with   • Annual Exam      History of Present Illness:     Adult Annual Physical   Patient here for a comprehensive physical exam  The patient reports having chronic pain related to her job  She has been doing massage therapy and exercises which she thinks is helping  Reports having more anxiety  She is having anxiety everyday  She does have palpitations and SOB when she does have high anxiety  She reports it being a physical anxiety where she gets tense and anxious  She is open to trying alternative options to manage the anxiety such as yoga and meditation  She has noticed a change in vision  She has a problem with night time driving  She states that her fair sighted vision is worsening  Since December 2021 she continues to have decreased taste and smell related to COVID infection  Diet and Physical Activity  · Diet/Nutrition: well balanced diet  · Exercise: no formal exercise  Depression Screening  PHQ-2/9 Depression Screening    Little interest or pleasure in doing things: 0 - not at all  Feeling down, depressed, or hopeless: 0 - not at all  PHQ-2 Score: 0  PHQ-2 Interpretation: Negative depression screen       General Health  · Sleep: sleeps poorly, gets 4-6 hours of sleep on average and unrefreshing sleep  · Hearing: normal - bilateral   · Vision: vision problems: night vision, far sighted vision worsening and most recent eye exam >1 year ago  · Dental: regular dental visits  /GYN Health  · Last menstrual period: 09/25/2022  · Contraceptive method: oral contraceptives  · History of STDs?: no      Review of Systems:     Review of Systems   Constitutional: Negative  HENT: Negative  Negative for congestion, dental problem, ear pain, hearing loss, postnasal drip, rhinorrhea, sinus pressure, sinus pain, sore throat, tinnitus and trouble swallowing           Anosmia Eyes: Negative  Respiratory: Negative  Cardiovascular: Negative  Gastrointestinal: Negative  Genitourinary: Negative  Musculoskeletal: Negative  Skin: Negative  Allergic/Immunologic: Negative  Neurological: Negative  Hematological: Negative  Psychiatric/Behavioral: Negative for agitation, behavioral problems, confusion, decreased concentration, dysphoric mood, hallucinations, self-injury, sleep disturbance and suicidal ideas  The patient is nervous/anxious  The patient is not hyperactive  Past Medical History:     Past Medical History:   Diagnosis Date   • Abnormal finding on examination of thyroid gland     Resolved 10/7/2015    • Abnormal Pap smear of cervix    • Fibroadenoma of breast    • Menorrhagia     Resolved 8/29/2016    • Methicillin resistant Staphylococcus aureus infection    • Mild cervical dysplasia    • Nicotine dependence 6/5/2013   • Shingles 12/30/2014      Past Surgical History:     Past Surgical History:   Procedure Laterality Date   • BREAST SURGERY Right     Lumpectomy    • COLPOSCOPY W/ BIOPSY / CURETTAGE  02/22/2007    FIDENCIO-1 noted at 12, 5, and 7 o'clock with the ECC negative  Repeat colposcopy 4/24/2009 with FIDENCIO-1 at 12 oclock, and negative biopsies at 2 and 6 o'clock and ECC      • TOOTH EXTRACTION        Social History:     Social History     Socioeconomic History   • Marital status: Single     Spouse name: None   • Number of children: 1   • Years of education: None   • Highest education level: None   Occupational History   • None   Tobacco Use   • Smoking status: Former Smoker   • Smokeless tobacco: Former User   Vaping Use   • Vaping Use: Never used   Substance and Sexual Activity   • Alcohol use: Yes     Comment: Socially    • Drug use: No   • Sexual activity: Yes     Partners: Male     Birth control/protection: OCP   Other Topics Concern   • None   Social History Narrative    Always uses seat belt    Caffeine use    [employer] ([employer]     Social Determinants of Health     Financial Resource Strain: Not on file   Food Insecurity: Not on file   Transportation Needs: Not on file   Physical Activity: Not on file   Stress: Not on file   Social Connections: Not on file   Intimate Partner Violence: Not on file   Housing Stability: Not on file      Family History:     Family History   Problem Relation Age of Onset   • Breast cancer Mother    • Breast cancer Maternal Grandmother    • Heart disease Maternal Grandfather    • Diabetes Maternal Grandfather    • Lung cancer Paternal Grandfather    • Colon cancer Maternal Aunt    • Osteoporosis Family    • Rectal cancer Family       Current Medications:     Current Outpatient Medications   Medication Sig Dispense Refill   • norgestimate-ethinyl estradiol (Rosalva) 0 25-35 MG-MCG per tablet Take 1 tablet by mouth daily 84 tablet 3     No current facility-administered medications for this visit  Allergies:     No Known Allergies   Physical Exam:     /60 (BP Location: Left arm, Patient Position: Sitting, Cuff Size: Standard)   Pulse 82   Temp 97 6 °F (36 4 °C) (Tympanic)   Ht 5' 4" (1 626 m)   Wt 62 3 kg (137 lb 6 4 oz)   BMI 23 58 kg/m²     Physical Exam  Vitals reviewed  Constitutional:       Appearance: Normal appearance  She is normal weight  HENT:      Head: Normocephalic and atraumatic  Right Ear: Tympanic membrane, ear canal and external ear normal       Left Ear: Tympanic membrane, ear canal and external ear normal       Nose: Nose normal       Mouth/Throat:      Mouth: Mucous membranes are moist       Pharynx: Oropharynx is clear  Eyes:      Conjunctiva/sclera: Conjunctivae normal       Pupils: Pupils are equal, round, and reactive to light  Cardiovascular:      Rate and Rhythm: Normal rate and regular rhythm  Heart sounds: Normal heart sounds  No murmur heard  Pulmonary:      Effort: Pulmonary effort is normal       Breath sounds: Normal breath sounds     Abdominal: General: Abdomen is flat  Bowel sounds are normal       Palpations: Abdomen is soft  There is no hepatomegaly or splenomegaly  Tenderness: There is no abdominal tenderness  Musculoskeletal:         General: Normal range of motion  Cervical back: Normal range of motion and neck supple  Skin:     General: Skin is warm and dry  Capillary Refill: Capillary refill takes less than 2 seconds  Neurological:      General: No focal deficit present  Mental Status: She is alert and oriented to person, place, and time  Psychiatric:         Mood and Affect: Mood normal          Behavior: Behavior normal          Thought Content:  Thought content normal          Judgment: Judgment normal           Pedro Lin, [address] [id number]

## 2023-06-21 ENCOUNTER — ANNUAL EXAM (OUTPATIENT)
Dept: GYNECOLOGY | Facility: CLINIC | Age: 33
End: 2023-06-21
Payer: COMMERCIAL

## 2023-06-21 VITALS
SYSTOLIC BLOOD PRESSURE: 112 MMHG | HEIGHT: 64 IN | WEIGHT: 138 LBS | DIASTOLIC BLOOD PRESSURE: 70 MMHG | BODY MASS INDEX: 23.56 KG/M2

## 2023-06-21 DIAGNOSIS — Z87.410 HISTORY OF CERVICAL DYSPLASIA: ICD-10-CM

## 2023-06-21 DIAGNOSIS — N92.0 MENORRHAGIA WITH REGULAR CYCLE: ICD-10-CM

## 2023-06-21 DIAGNOSIS — Z01.419 WOMEN'S ANNUAL ROUTINE GYNECOLOGICAL EXAMINATION: Primary | ICD-10-CM

## 2023-06-21 DIAGNOSIS — Z30.41 ENCOUNTER FOR SURVEILLANCE OF CONTRACEPTIVE PILLS: ICD-10-CM

## 2023-06-21 DIAGNOSIS — D24.2 FIBROADENOMA OF LEFT BREAST: ICD-10-CM

## 2023-06-21 PROCEDURE — S0612 ANNUAL GYNECOLOGICAL EXAMINA: HCPCS | Performed by: OBSTETRICS & GYNECOLOGY

## 2023-06-21 RX ORDER — NORGESTIMATE AND ETHINYL ESTRADIOL 0.25-0.035
1 KIT ORAL DAILY
Qty: 84 TABLET | Refills: 3 | Status: SHIPPED | OUTPATIENT
Start: 2023-06-21

## 2023-06-21 NOTE — PROGRESS NOTES
Assessment/Plan   Diagnoses and all orders for this visit:    Women's annual routine gynecological examination    Fibroadenoma of left breast    Encounter for surveillance of contraceptive pills  -     norgestimate-ethinyl estradiol (Rosalva) 0 25-35 MG-MCG per tablet; Take 1 tablet by mouth daily    Menorrhagia with regular cycle    History of cervical dysplasia    1  yearly exam-Pap smear deferred, self breast awareness reviewed  2  contraception-continues on Sprintec or its equivalent  Electronic prescription for 3 packs refill 3 was sent to the local pharmacy, [facility]   3  left breast fibroadenoma- had imaging 2019/2020/2021 with 9 mm likely fibroadenoma noted at 6:00 left breast   It was unchanged  Radiology did not recommend any imaging unless clinical concern  Has been 0 75 to 1 cm, stable on exam   She was previously offered referral to breast specialist, defers this  Had prior right breast fibroadenoma removed by Dr Meet Coburn at [facility] without recurrence  4  prior right viri fibroadenoma-resolved after surgery  5  family history of breast cancer- mother and maternal grandmother both diagnosed with breast cancer  Mom with genetic testing  6  history of top normal thyroid-no current concerns  7  family history colorectal cancer-would recommend colonoscopy age 35 unless genetic condition identified  6  other-daughter now age 12, driving  Congratulations given  Follow-up 1 year for yearly exam or as needed  Subjective   Patient ID: Odalys Chamorro is a 28 y o  female  Vitals:    06/21/23 1136   BP: 112/70     Patient was seen today for yearly exam   Please see assessment plan for details        The following portions of the patient's history were reviewed and updated as appropriate: allergies, current medications, past family history, past medical history, past social history, past surgical history and problem list   Past Medical History:   Diagnosis Date   • Abnormal finding on examination of thyroid gland     Resolved 10/7/2015    • Abnormal Pap smear of cervix    • Fibroadenoma of breast    • Menorrhagia     Resolved 2016    • Methicillin resistant Staphylococcus aureus infection    • Mild cervical dysplasia    • Nicotine dependence 2013   • Shingles 2014     Past Surgical History:   Procedure Laterality Date   • BREAST SURGERY Right     Lumpectomy    • COLPOSCOPY W/ BIOPSY / CURETTAGE  2007    FIDENCIO-1 noted at 12, 5, and 7 o'clock with the ECC negative  Repeat colposcopy 2009 with FIDENCIO-1 at 12 oclock, and negative biopsies at 2 and 6 o'clock and ECC      • TOOTH EXTRACTION       OB History    Para Term  AB Living   1 1 1     1   SAB IAB Ectopic Multiple Live Births           1      # Outcome Date GA Lbr Ar/2nd Weight Sex Delivery Anes PTL Lv   1 Term                Current Outpatient Medications:   •  Rosalva 0 25-35 MG-MCG per tablet, TAKE 1 TABLET BY MOUTH EVERY DAY, Disp: 84 tablet, Rfl: 0  No Known Allergies  Social History     Socioeconomic History   • Marital status: Single     Spouse name: None   • Number of children: 1   • Years of education: None   • Highest education level: None   Occupational History   • None   Tobacco Use   • Smoking status: Former   • Smokeless tobacco: Former   Vaping Use   • Vaping Use: Never used   Substance and Sexual Activity   • Alcohol use: Yes     Comment: Socially    • Drug use: No   • Sexual activity: Yes     Partners: Male     Birth control/protection: OCP   Other Topics Concern   • None   Social History Narrative    Always uses seat belt    Caffeine use    [address] ([employer]     Social Determinants of Health     Financial Resource Strain: Not on file   Food Insecurity: Not on file   Transportation Needs: Not on file   Physical Activity: Not on file   Stress: Not on file   Social Connections: Not on file   Intimate Partner Violence: Not on file   Housing Stability: Not on file     Family History   Problem "Relation Age of Onset   • Breast cancer Mother    • Breast cancer Maternal Grandmother    • Heart disease Maternal Grandfather    • Diabetes Maternal Grandfather    • Lung cancer Paternal Grandfather    • Colon cancer Maternal Aunt    • Osteoporosis Family    • Rectal cancer Family        Review of Systems   Constitutional: Negative for chills, diaphoresis, fatigue and fever  Respiratory: Negative for apnea, cough, chest tightness, shortness of breath and wheezing  Cardiovascular: Negative for chest pain, palpitations and leg swelling  Gastrointestinal: Negative for abdominal distention, abdominal pain, anal bleeding, constipation, diarrhea, nausea, rectal pain and vomiting  Genitourinary: Negative for difficulty urinating, dyspareunia, dysuria, frequency, hematuria, menstrual problem, pelvic pain, urgency, vaginal bleeding, vaginal discharge and vaginal pain  Musculoskeletal: Negative for arthralgias, back pain and myalgias  Skin: Negative for color change and rash  Neurological: Negative for dizziness, syncope, light-headedness, numbness and headaches  Hematological: Negative for adenopathy  Does not bruise/bleed easily  Psychiatric/Behavioral: Negative for dysphoric mood and sleep disturbance  The patient is not nervous/anxious  Objective   Physical Exam  OBGyn Exam     Objective      /70 (BP Location: Right arm, Patient Position: Sitting)   Ht 5' 4\" (1 626 m)   Wt 62 6 kg (138 lb)   LMP 06/12/2023   BMI 23 69 kg/m²     General:   alert and oriented, in no acute distress   Neck: normal to inspection and palpation   Breast:  Right breast without any masses or lesions or discharge  Left breast with faint 1 cm fullness at 6:00, approximately 4 cm below the areola  It is mobile, without tenderness or warmth or discharge or erythema   Heart:    Lungs:    Abdomen: soft, non-tender, without masses or organomegaly   Vulva: normal   Vagina: Without erythema or lesions or discharge    " Normal   Cervix: Without lesions or discharge or cervicitis    No Cervical motion tenderness   Uterus: top normal size, anteverted, non-tender   Adnexa: no mass, fullness, tenderness   Rectum: negative    Psych:  Normal mood and affect   Skin:  Without obvious lesions   Eyes: symmetric, with normal movements and reactivity   Musculoskeletal:  Normal muscle tone and movements appreciated

## 2024-03-15 ENCOUNTER — TELEPHONE (OUTPATIENT)
Dept: GYNECOLOGY | Facility: CLINIC | Age: 34
End: 2024-03-15

## 2024-03-15 NOTE — TELEPHONE ENCOUNTER
Left a message stating  will not be in office the week of 6/25-6/28. Asked patient to call back and reschedule their apt.

## 2024-03-21 NOTE — TELEPHONE ENCOUNTER
Second message left stating  will not be in office the week of 6/25-6/28. Asked patient to call back and reschedule their apt.         Letter sent

## 2024-03-29 ENCOUNTER — OFFICE VISIT (OUTPATIENT)
Dept: FAMILY MEDICINE CLINIC | Facility: HOSPITAL | Age: 34
End: 2024-03-29
Payer: COMMERCIAL

## 2024-03-29 VITALS
DIASTOLIC BLOOD PRESSURE: 72 MMHG | TEMPERATURE: 97.7 F | SYSTOLIC BLOOD PRESSURE: 114 MMHG | WEIGHT: 133.8 LBS | HEIGHT: 64 IN | BODY MASS INDEX: 22.84 KG/M2 | HEART RATE: 81 BPM

## 2024-03-29 DIAGNOSIS — Z80.0 FAMILY HISTORY OF COLON CANCER: ICD-10-CM

## 2024-03-29 DIAGNOSIS — Z13.31 POSITIVE DEPRESSION SCREENING: ICD-10-CM

## 2024-03-29 DIAGNOSIS — L29.0 PRURITUS ANI: Primary | ICD-10-CM

## 2024-03-29 PROCEDURE — 99213 OFFICE O/P EST LOW 20 MIN: CPT | Performed by: NURSE PRACTITIONER

## 2024-03-29 NOTE — PROGRESS NOTES
Name: Yesenia Iverson      : 1990      MRN: [id number]  Encounter Provider: SAMUEL Marley  Encounter Date: 3/29/2024   Encounter department: [facility] [address]     Assessment & Plan     1. Pruritus ani  Comments:  advise she trial topical OTC cortisone and/or coconut oil externally only, consult entered for further eval w/GI  Orders:  -     Ambulatory Referral to Gastroenterology; Future    2. Family history of colon cancer  Comments:  GI consult entered - consider initiating colon screening given FH colon & rectal CA  Orders:  -     Ambulatory Referral to Gastroenterology; Future    3. Positive depression screening  Comments:  PHQ score of 5 - she denies depressive concerns and relays more anxiety sx's, practicing mindfulness & denies need for further tx at this time, return if needed        Depression Screening and Follow-up Plan: Patient's depression screening was positive with a PHQ-2 score of 3. Their PHQ-9 score was 5. Clincally patient does not have depression. No treatment is required.         Subjective      Cousin diagnosed with rectal cancer at a young age 10 years ago. Pt states she is asymptomatic except has an itchy bottom - feels it more off to the side. No known hemorrhoids. Had 1 episode of bright red blood when wiping about 2 months ago - first episode and hasn't happened since. Denies having to strain for BM but recalls feeling more irritated at that time. Has used preparation H but this burned and didn't offer relief.        Review of Systems   Gastrointestinal:  Positive for diarrhea (stool occ softer). Negative for abdominal pain, blood in stool and constipation.   Psychiatric/Behavioral:  Negative for dysphoric mood. The patient is nervous/anxious (situational stress).        Current Outpatient Medications on File Prior to Visit   Medication Sig    norgestimate-ethinyl estradiol (Rosalva) 0.25-35 MG-MCG per tablet Take 1 tablet by mouth daily       Objective  "    /72   Pulse 81   Temp 97.7 °F (36.5 °C)   Ht 5' 4\" (1.626 m)   Wt 60.7 kg (133 lb 12.8 oz)   BMI 22.97 kg/m²       Physical Exam  Vitals reviewed.   Constitutional:       General: She is not in acute distress.     Appearance: Normal appearance.   HENT:      Head: Normocephalic.   Eyes:      General: No scleral icterus.  Pulmonary:      Effort: Pulmonary effort is normal. No respiratory distress.   Genitourinary:     Exam position: Knee-chest position.      Rectum: No anal fissure or external hemorrhoid.       Skin:     General: Skin is warm and dry.   Neurological:      General: No focal deficit present.      Mental Status: She is alert and oriented to person, place, and time.   Psychiatric:         Mood and Affect: Mood normal.         Behavior: Behavior normal.         Thought Content: Thought content normal.         Judgment: Judgment normal.           SAMUEL Marley      Depression Screening Follow-up Plan: Patient's depression screening was positive with a PHQ-2 score of 3. Their PHQ-9 score was 5. Clinically patient does not have depression. No treatment is required.  "

## 2024-04-24 ENCOUNTER — CONSULT (OUTPATIENT)
Dept: GASTROENTEROLOGY | Facility: CLINIC | Age: 34
End: 2024-04-24
Payer: COMMERCIAL

## 2024-04-24 VITALS
WEIGHT: 134.8 LBS | BODY MASS INDEX: 23.01 KG/M2 | HEIGHT: 64 IN | DIASTOLIC BLOOD PRESSURE: 76 MMHG | TEMPERATURE: 98.6 F | SYSTOLIC BLOOD PRESSURE: 102 MMHG

## 2024-04-24 DIAGNOSIS — L29.0 PRURITUS ANI: ICD-10-CM

## 2024-04-24 DIAGNOSIS — Z80.0 FAMILY HISTORY OF COLON CANCER: ICD-10-CM

## 2024-04-24 DIAGNOSIS — R19.4 CHANGE IN BOWEL HABITS: ICD-10-CM

## 2024-04-24 DIAGNOSIS — K62.5 BRIGHT RED BLOOD PER RECTUM: Primary | ICD-10-CM

## 2024-04-24 PROCEDURE — 99244 OFF/OP CNSLTJ NEW/EST MOD 40: CPT | Performed by: INTERNAL MEDICINE

## 2024-04-24 NOTE — PROGRESS NOTES
[facility] - Outpatient Consultation  Yesenia Iverson 33 y.o. female MRN: [id number]  Encounter: [id number]          ASSESSMENT AND PLAN:      1. Pruritus ani  - Ambulatory Referral to Gastroenterology  - Colonoscopy; Future  Discussed differential of hemorrhoids, anal dermatitis, or idiopathic pruritus ani as etiology.  Will further evaluate and rule out internal hemorrhoids, anal lesions, proctitis as a contributing factor during colonoscopy.  2. Family history of colon cancer  2 second-degree relatives and young age diagnosed, given new symptoms she should undergo diagnostic colonoscopy for that reason however if this is normal she can resume age-appropriate colorectal cancer screening.   - Ambulatory Referral to Gastroenterology  - Colonoscopy; Future    3. Bright red blood per rectum   Broad differential but most likely benign anorectal etiology.  Further evaluation with colonoscopy.  - Colonoscopy; Future    4. Change in bowel habits  No overt diarrhea, occasional soft stools.  Continue to monitor for now  - Colonoscopy; Future    ______________________________________________________________________    HPI: 33-year-old female who is otherwise healthy, presenting for evaluation of 2-year history of pruritus ani that is worsening recently along with occasional small amounts of bright red blood per rectum and fluctuating soft bowel movements.  She also has a family history in her first cousin and aunt both with colon cancer.  Her cousin was very young at age 24 when she was diagnosed.  She has noticed worsening issues with itching in the anal area over the past 2 years.  She has tried multiple interventions including topical corticosteroids and other natural remedies.  She has changed her diet in multiple ways which did help with the bloating which is now completely resolved but continues to have itching in the anal area.  She recently had an appoint with her family doctor and had a  rectal exam without any clear external abnormalities to explain this.  She denies using wipes or other irritants.  She has had 1 child vaginally but without complex tearing or other injuries.    REVIEW OF SYSTEMS:    CONSTITUTIONAL: Denies any fever, chills, rigors, and weight loss.  HEENT: Denies odynophagia, tinnitus  CARDIOVASCULAR: No chest pain or palpitations.   RESPIRATORY: Denies any cough, hemoptysis, shortness of breath or dyspnea on exertion.  GASTROINTESTINAL: As noted in the History of Present Illness.   GENITOURINARY: No problems with urination. Denies any hematuria or dysuria.  NEUROLOGIC: No dizziness or vertigo, denies headaches.   MUSCULOSKELETAL: Denies any muscle or joint pain.   SKIN: Denies skin rashes or itching.   ENDOCRINE:  Denies intolerance to heat or cold.  PSYCHOSOCIAL: Denies depression or anxiety. Denies any recent memory loss.       Historical Information   Past Medical History:   Diagnosis Date   • Abnormal finding on examination of thyroid gland     Resolved 10/7/2015    • Abnormal Pap smear of cervix    • Fibroadenoma of breast    • Menorrhagia     Resolved 8/29/2016    • Methicillin resistant Staphylococcus aureus infection    • Mild cervical dysplasia    • Nicotine dependence 6/5/2013   • Shingles 12/30/2014     Past Surgical History:   Procedure Laterality Date   • BREAST SURGERY Right     Lumpectomy    • COLPOSCOPY W/ BIOPSY / CURETTAGE  02/22/2007    FIDENCIO-1 noted at 12, 5, and 7 o'clock with the ECC negative. Repeat colposcopy 4/24/2009 with FIDENCIO-1 at 12 oclock, and negative biopsies at 2 and 6 o'clock and ECC.    • TOOTH EXTRACTION       Social History   Social History     Substance and Sexual Activity   Alcohol Use Yes    Comment: Socially      Social History     Substance and Sexual Activity   Drug Use No     Social History     Tobacco Use   Smoking Status Former   Smokeless Tobacco Former     Family History   Problem Relation Age of Onset   • Breast cancer Mother    •  "Breast cancer Maternal Grandmother    • Heart disease Maternal Grandfather    • Diabetes Maternal Grandfather    • Lung cancer Paternal Grandfather    • Colon cancer Maternal Aunt    • Osteoporosis Family    • Rectal cancer Family        Meds/Allergies       Current Outpatient Medications:   •  norgestimate-ethinyl estradiol (Rosalva) 0.25-35 MG-MCG per tablet    No Known Allergies        Objective     Blood pressure 102/76, temperature 98.6 °F (37 °C), temperature source Tympanic, height 5' 4\" (1.626 m), weight 61.1 kg (134 lb 12.8 oz). Body mass index is 23.14 kg/m².        PHYSICAL EXAM:      General Appearance:   Alert, cooperative, no distress   HEENT:   Normocephalic, atraumatic, anicteric.     Neck:  Supple, symmetrical, trachea midline   Lungs:   Clear to auscultation bilaterally; no rales, rhonchi or wheezing; respirations unlabored    Heart::   Regular rate and rhythm; no murmur, rub, or gallop.   Abdomen:   Soft, non-tender, non-distended; normal bowel sounds; no masses, no organomegaly    Genitalia:   Deferred    Rectal:   Deferred    Extremities:  No cyanosis, clubbing or edema    Pulses:  2+ and symmetric    Skin:  No jaundice, rashes, or lesions          Lab Results:   No visits with results within 1 Day(s) from this visit.   Latest known visit with results is:   Annual Exam on 06/08/2022   Component Date Value   • Case Report 06/08/2022                      Value:Gynecologic Cytology Report                       Case: [id number]                                  Authorizing Provider:  Neal Guadalupe MD            Collected:           06/08/2022 1323              Ordering Location:     [facility]    Received:            06/08/2022 1323                                     [place]                                                                   First Screen:          Trice Ken, CT                                                         Specimen:    LIQUID-BASED PAP, SCREENING, Cervix, " Endocervical                                         • Primary Interpretation 06/08/2022 Negative for intraepithelial lesion or malignancy    • Specimen Adequacy 06/08/2022 Satisfactory for evaluation. Endocervical/transformation zone component present.    • Additional Information 06/08/2022                      Value:This result contains rich text formatting which cannot be displayed here.   • LMP 06/08/2022 5/8/2022    • HPV Other HR 06/08/2022 Negative    • HPV16 06/08/2022 Negative    • HPV18 06/08/2022 Negative          Radiology Results:   No results found.  Answers submitted by the patient for this visit:  Abdominal Pain Questionnaire (Submitted on 4/21/2024)  Chief Complaint: Abdominal pain  Radiates to: perineum  anorexia: No  arthralgias: Yes  belching: No  constipation: No  diarrhea: Yes  dysuria: No  fever: No  flatus: No  frequency: No  headaches: No  hematochezia: No  hematuria: No  melena: No  myalgias: Yes  nausea: No  weight loss: No  vomiting: No

## 2024-04-24 NOTE — PATIENT INSTRUCTIONS
Scheduled date of colonoscopy (as of today):6/14/24  Physician performing colonoscopy:Dr. Reyes  Location of colonoscopy:[facility]  Bowel prep reviewed with patient:Miralax/Dulcolax  Instructions reviewed with patient by:Kristal  Clearances:N/A

## 2024-05-31 ENCOUNTER — PATIENT MESSAGE (OUTPATIENT)
Dept: GASTROENTEROLOGY | Facility: CLINIC | Age: 34
End: 2024-05-31

## 2024-06-14 ENCOUNTER — ANESTHESIA (OUTPATIENT)
Dept: GASTROENTEROLOGY | Facility: HOSPITAL | Age: 34
End: 2024-06-14

## 2024-06-14 ENCOUNTER — ANESTHESIA EVENT (OUTPATIENT)
Dept: GASTROENTEROLOGY | Facility: HOSPITAL | Age: 34
End: 2024-06-14

## 2024-06-14 ENCOUNTER — HOSPITAL ENCOUNTER (OUTPATIENT)
Dept: GASTROENTEROLOGY | Facility: HOSPITAL | Age: 34
Setting detail: OUTPATIENT SURGERY
End: 2024-06-14
Attending: INTERNAL MEDICINE
Payer: COMMERCIAL

## 2024-06-14 VITALS
OXYGEN SATURATION: 97 % | WEIGHT: 130 LBS | HEIGHT: 64 IN | BODY MASS INDEX: 22.2 KG/M2 | DIASTOLIC BLOOD PRESSURE: 83 MMHG | RESPIRATION RATE: 18 BRPM | SYSTOLIC BLOOD PRESSURE: 118 MMHG | HEART RATE: 61 BPM | TEMPERATURE: 97.6 F

## 2024-06-14 DIAGNOSIS — R19.4 CHANGE IN BOWEL HABITS: ICD-10-CM

## 2024-06-14 DIAGNOSIS — L29.0 PRURITUS ANI: ICD-10-CM

## 2024-06-14 DIAGNOSIS — Z80.0 FAMILY HISTORY OF COLON CANCER: ICD-10-CM

## 2024-06-14 DIAGNOSIS — K62.5 BRIGHT RED BLOOD PER RECTUM: ICD-10-CM

## 2024-06-14 LAB
EXT PREGNANCY TEST URINE: NEGATIVE
EXT. CONTROL: NORMAL

## 2024-06-14 PROCEDURE — 45380 COLONOSCOPY AND BIOPSY: CPT | Performed by: INTERNAL MEDICINE

## 2024-06-14 PROCEDURE — 88305 TISSUE EXAM BY PATHOLOGIST: CPT | Performed by: PATHOLOGY

## 2024-06-14 PROCEDURE — 81025 URINE PREGNANCY TEST: CPT | Performed by: INTERNAL MEDICINE

## 2024-06-14 RX ORDER — SODIUM CHLORIDE 9 MG/ML
INJECTION, SOLUTION INTRAVENOUS CONTINUOUS PRN
Status: DISCONTINUED | OUTPATIENT
Start: 2024-06-14 | End: 2024-06-14

## 2024-06-14 RX ORDER — PROPOFOL 10 MG/ML
INJECTION, EMULSION INTRAVENOUS AS NEEDED
Status: DISCONTINUED | OUTPATIENT
Start: 2024-06-14 | End: 2024-06-14

## 2024-06-14 RX ADMIN — SODIUM CHLORIDE: 0.9 INJECTION, SOLUTION INTRAVENOUS at 12:19

## 2024-06-14 RX ADMIN — PROPOFOL 50 MG: 10 INJECTION, EMULSION INTRAVENOUS at 12:37

## 2024-06-14 RX ADMIN — PROPOFOL 40 MG: 10 INJECTION, EMULSION INTRAVENOUS at 12:51

## 2024-06-14 RX ADMIN — PROPOFOL 130 MG: 10 INJECTION, EMULSION INTRAVENOUS at 12:32

## 2024-06-14 RX ADMIN — PROPOFOL 50 MG: 10 INJECTION, EMULSION INTRAVENOUS at 12:35

## 2024-06-14 NOTE — ANESTHESIA PREPROCEDURE EVALUATION
Procedure:  COLONOSCOPY    Relevant Problems   No relevant active problems        Physical Exam    Airway    Mallampati score: II  TM Distance: >3 FB  Neck ROM: full     Dental   Comment: None loose, No notable dental hx     Cardiovascular      Pulmonary      Other Findings  post-pubertal.      Anesthesia Plan  ASA Score- 1     Anesthesia Type- IV sedation with anesthesia with ASA Monitors.         Additional Monitors:     Airway Plan:     Comment: Last of PO bowel prep: 06:00    Urine hcg = negative    Patient educated on the possibility for awareness under sedation and of the possibility of airway intervention in the event of an airway or procedural emergency  .       Plan Factors-Exercise tolerance (METS): >4 METS.    Chart reviewed.    Patient summary reviewed.    Patient is not a current smoker.              Induction- intravenous.    Postoperative Plan-         Informed Consent- Anesthetic plan and risks discussed with patient.  I personally reviewed this patient with the CRNA. Discussed and agreed on the Anesthesia Plan with the CRNA..

## 2024-06-14 NOTE — H&P
"History and Physical -  [facility]  Yesenia Iverson 34 y.o. female MRN: [id number]    HPI: Yesenia Iverson is a 34 y.o. year old female who presents for colonoscopy.  She has had an episode of rectal bleeding and anal itching and discomfort.  Also multiple second-degree relatives with colon cancer    REVIEW OF SYSTEMS: Per the HPI, and otherwise unremarkable.    Historical Information   Past Medical History:   Diagnosis Date    Abnormal finding on examination of thyroid gland     Resolved 10/7/2015     Abnormal Pap smear of cervix     Fibroadenoma of breast     Menorrhagia     Resolved 8/29/2016     Methicillin resistant Staphylococcus aureus infection     Mild cervical dysplasia     Nicotine dependence 6/5/2013    Shingles 12/30/2014     Past Surgical History:   Procedure Laterality Date    BREAST SURGERY Right     Lumpectomy     COLPOSCOPY W/ BIOPSY / CURETTAGE  02/22/2007    FIDENCIO-1 noted at 12, 5, and 7 o'clock with the ECC negative. Repeat colposcopy 4/24/2009 with FIDENCIO-1 at 12 oclock, and negative biopsies at 2 and 6 o'clock and ECC.     TOOTH EXTRACTION       Social History   Social History     Substance and Sexual Activity   Alcohol Use Yes    Comment: Socially      Social History     Substance and Sexual Activity   Drug Use No     Social History     Tobacco Use   Smoking Status Former   Smokeless Tobacco Former     Family History   Problem Relation Age of Onset    Breast cancer Mother     Breast cancer Maternal Grandmother     Heart disease Maternal Grandfather     Diabetes Maternal Grandfather     Lung cancer Paternal Grandfather     Colon cancer Maternal Aunt     Osteoporosis Family     Rectal cancer Family        Meds/Allergies     No current outpatient medications on file.    No Known Allergies    Objective     /88   Pulse 91   Temp 97.6 °F (36.4 °C) (Temporal)   Resp 18   Ht 5' 4\" (1.626 m)   Wt 59 kg (130 lb)   LMP 05/17/2024 (Approximate)   SpO2 98%   BMI 22.31 kg/m² "     PHYSICAL EXAM    Gen: NAD AAOx3  Head: Normocephalic, Atraumatic  CV: S1S2 RRR no m/r/g  CHEST: Clear b/l no c/r/w  ABD: soft, +BS NT/ND  EXT: no edema    ASSESSMENT/PLAN:  This is a 34 y.o. year old female here for diagnostic colonoscopy, and she is stable and optimized for her procedure.

## 2024-06-14 NOTE — ANESTHESIA POSTPROCEDURE EVALUATION
Post-Op Assessment Note    CV Status:  Stable    Pain management: adequate       Mental Status:  Alert and awake   Hydration Status:  Euvolemic   PONV Controlled:  Controlled   Airway Patency:  Patent     Post Op Vitals Reviewed: Yes    No anethesia notable event occurred.    Staff: CRNA   Comments: report given to RN; TANIKA; RUBEN              BP   107/72   Temp      Pulse  69   Resp   18   SpO2   98

## 2024-06-19 PROCEDURE — 88305 TISSUE EXAM BY PATHOLOGIST: CPT | Performed by: PATHOLOGY

## 2024-06-20 NOTE — RESULT ENCOUNTER NOTE
Colonoscopy just revealed hyperplastic polyp.  Has impressive family history of multiple second-degree relatives at a young age with colon cancer.  Recall for 5 years.

## 2024-07-02 ENCOUNTER — TELEPHONE (OUTPATIENT)
Dept: FAMILY MEDICINE CLINIC | Facility: HOSPITAL | Age: 34
End: 2024-07-02

## 2024-07-02 NOTE — TELEPHONE ENCOUNTER
----- Message from Beth TURNER sent at 6/24/2024  1:40 PM EDT -----  LM FOR CB  ----- Message -----  From: Beth Tilley MA  Sent: 6/21/2024   2:37 PM EDT  To: [place] [id number] Clerical    LM FOR CB  ----- Message -----  From: SAMUEL Murry  Sent: 6/20/2024   1:07 PM EDT  To: [place] [id number] Clerical    Please schedule pt for PE

## 2024-07-10 ENCOUNTER — ANNUAL EXAM (OUTPATIENT)
Dept: GYNECOLOGY | Facility: CLINIC | Age: 34
End: 2024-07-10
Payer: COMMERCIAL

## 2024-07-10 VITALS
HEIGHT: 65 IN | DIASTOLIC BLOOD PRESSURE: 80 MMHG | BODY MASS INDEX: 22.73 KG/M2 | SYSTOLIC BLOOD PRESSURE: 122 MMHG | WEIGHT: 136.4 LBS

## 2024-07-10 DIAGNOSIS — Z01.419 WOMEN'S ANNUAL ROUTINE GYNECOLOGICAL EXAMINATION: Primary | ICD-10-CM

## 2024-07-10 DIAGNOSIS — D24.2 FIBROADENOMA OF LEFT BREAST: ICD-10-CM

## 2024-07-10 DIAGNOSIS — Z11.51 SCREENING FOR HUMAN PAPILLOMAVIRUS (HPV): ICD-10-CM

## 2024-07-10 DIAGNOSIS — Z87.410 HISTORY OF CERVICAL DYSPLASIA: ICD-10-CM

## 2024-07-10 PROCEDURE — G0476 HPV COMBO ASSAY CA SCREEN: HCPCS | Performed by: OBSTETRICS & GYNECOLOGY

## 2024-07-10 PROCEDURE — S0612 ANNUAL GYNECOLOGICAL EXAMINA: HCPCS | Performed by: OBSTETRICS & GYNECOLOGY

## 2024-07-10 PROCEDURE — G0145 SCR C/V CYTO,THINLAYER,RESCR: HCPCS | Performed by: OBSTETRICS & GYNECOLOGY

## 2024-07-10 NOTE — PROGRESS NOTES
Assessment & Plan   Diagnoses and all orders for this visit:    Women's annual routine gynecological examination    Fibroadenoma of left breast    History of cervical dysplasia    1. yearly exam-Pap smear done with HPV testing, self breast awareness reviewed  2. contraception-stopped Sprintec as of March 2024.  She is doing well off of OCP at this time.  He has noted improvement of panic and anxiety symptoms off of OCP.  She will call with any issues.  She is currently using condoms in the midcycle and timing.  Cycles are 26-day intervals with ovulation around day 14.  She does follow her periods closely along with ovulation kits to determine when she is ovulating.  We did discuss other options.  She would really like to limit hormonal exposure.  Would suggest she might consider low-dose hormone such as Mirena IUD which could also help with menstrual cycles as she does have a history of heavy menses.  So far, her periods been okay and she will hold off on IUD.  She is strongly considering tubal ligation/salpingectomy and we discussed this in some detail.  She will call back if interested in proceeding with that.  3.  Left breast fibroadenoma-continued fullness noted at 6:00 on the left breast, approximately 4 cm below the areola, it is without warmth or erythema or discharge or tenderness.  Has been stable clinically.  She did have imaging 2019, 2020, and 2021 with stable 9 mm fibroadenoma without change.  Recommendations to follow clinically at this time.  4. prior right breast fibroadenoma-removed surgically without recurrence  5.  Family history of breast cancer-mother maternal grandmother diagnosed with breast cancer.  Grandmother was in her 80s, mom was likely mid to late 40s.  Mom had genetic testing which was negative.  Suggested we consider starting mammograms about 10 years prior to mom's diagnosis.  She will check exact age and she will let me know.  To consider mammogram in next few years based on that  finding.  6.  Previous history of top normal thyroid-exam today is normal.  7.  Family history of colorectal cancer-noted in her first cousin.  Had colonoscopy 2024 with polyp found, 5-year follow-up recommended  8.  History of cervical dysplasia-reviewing the records, had colposcopy , , and  all with FIDENCIO-1.  No documentation of LEEP or cryosurgery is noted and patient does not recall this.  It has been normal since then.  Pap with HPV done today with disposition as per findings.  9. other-daughter now age 17, landed today in [place] with a school trip for the next 10 days.  Follow-up 1 year for yearly exam or as needed.    Subjective   Patient ID: Yesenia Iverson is a 34 y.o. female.    Vitals:    07/10/24 0921   BP: 122/80     The patient was seen today for yearly exam.  Please see assessment plan for details.        The following portions of the patient's history were reviewed and updated as appropriate: allergies, current medications, past family history, past medical history, past social history, past surgical history, and problem list.  Past Medical History:   Diagnosis Date    Abnormal finding on examination of thyroid gland     Resolved 10/7/2015     Abnormal Pap smear of cervix     Fibroadenoma of breast     Menorrhagia     Resolved 2016     Methicillin resistant Staphylococcus aureus infection     Mild cervical dysplasia     Nicotine dependence 2013    Shingles 2014     Past Surgical History:   Procedure Laterality Date    BREAST SURGERY Right     Lumpectomy     COLPOSCOPY W/ BIOPSY / CURETTAGE  2007    FIDENCIO-1 noted at 12, 5, and 7 o'clock with the ECC negative. Repeat colposcopy 2009 with FIDENCIO-1 at 12 oclock, and negative biopsies at 2 and 6 o'clock and ECC.     TOOTH EXTRACTION       OB History    Para Term  AB Living   1 1 1     1   SAB IAB Ectopic Multiple Live Births           1      # Outcome Date GA Lbr Ar/2nd Weight Sex Type Anes PTL Lv   1 Term               No current outpatient medications on file.  No Known Allergies  Social History     Socioeconomic History    Marital status: Single     Spouse name: None    Number of children: 1    Years of education: None    Highest education level: None   Occupational History    None   Tobacco Use    Smoking status: Former    Smokeless tobacco: Former   Vaping Use    Vaping status: Never Used   Substance and Sexual Activity    Alcohol use: Yes     Comment: Socially     Drug use: No    Sexual activity: Yes     Partners: Male     Birth control/protection: Condom Male   Other Topics Concern    None   Social History Narrative    Always uses seat belt    Caffeine use    [employer] ([employer])     Social Determinants of Health     Financial Resource Strain: Not on file   Food Insecurity: Not on file   Transportation Needs: Not on file   Physical Activity: Not on file   Stress: Not on file   Social Connections: Not on file   Intimate Partner Violence: Not on file   Housing Stability: Not on file     Family History   Problem Relation Age of Onset    Breast cancer Mother     Breast cancer Maternal Grandmother     Heart disease Maternal Grandfather     Diabetes Maternal Grandfather     Lung cancer Paternal Grandfather     Colon cancer Maternal Aunt     Osteoporosis Family     Rectal cancer Family        Review of Systems   Constitutional:  Negative for chills, diaphoresis, fatigue and fever.   Respiratory:  Negative for apnea, cough, chest tightness, shortness of breath and wheezing.    Cardiovascular:  Negative for chest pain, palpitations and leg swelling.   Gastrointestinal:  Negative for abdominal distention, abdominal pain, anal bleeding, constipation, diarrhea, nausea, rectal pain and vomiting.   Genitourinary:  Negative for difficulty urinating, dyspareunia, dysuria, frequency, hematuria, menstrual problem, pelvic pain, urgency, vaginal bleeding, vaginal discharge and vaginal pain.   Musculoskeletal:   "Negative for arthralgias, back pain and myalgias.   Skin:  Negative for color change and rash.   Neurological:  Negative for dizziness, syncope, light-headedness, numbness and headaches.   Hematological:  Negative for adenopathy. Does not bruise/bleed easily.   Psychiatric/Behavioral:  Negative for dysphoric mood and sleep disturbance. The patient is not nervous/anxious.        Objective   Physical Exam  OBGyn Exam     Objective      /80 (BP Location: Right arm, Patient Position: Sitting)   Ht 5' 4.5\" (1.638 m)   Wt 61.9 kg (136 lb 6.4 oz)   LMP 06/20/2024 (Approximate)   BMI 23.05 kg/m²     General:   alert and oriented, in no acute distress   Neck: normal to inspection and palpation   Breast:    Heart:    Lungs:    Abdomen: soft, non-tender, without masses or organomegaly   Vulva: normal   Vagina: Without erythema or lesions or discharge.  Normal   Cervix: Without lesions or discharge or cervicitis.  No Cervical motion tenderness   Uterus: top normal size, anteverted, non-tender   Adnexa: no mass, fullness, tenderness   Rectum: negative    Psych:  Normal mood and affect   Skin:  Without obvious lesions   Eyes: symmetric, with normal movements and reactivity   Musculoskeletal:  Normal muscle tone and movements appreciated       "

## 2024-07-16 LAB
LAB AP GYN PRIMARY INTERPRETATION: NORMAL
LAB AP LMP: NORMAL
Lab: NORMAL
PATH INTERP SPEC-IMP: NORMAL

## 2024-09-23 ENCOUNTER — TELEPHONE (OUTPATIENT)
Age: 34
End: 2024-09-23

## 2024-09-23 NOTE — TELEPHONE ENCOUNTER
Reason for call: Not on active medication list. Routing to office for review and approval.    [x] Refill   [] Prior Auth  [] Other:     Office:   [] PCP/Provider -   [x] Specialty/Provider - GYN- Dr Guadalupe     Medication: Rosalva     Dose/Frequency: 0.25-35 mg  / daily     Quantity: 90D    Pharmacy: [facility]    Does the patient have enough for 3 days?   [x] Yes   [] No - Send as HP to POD

## 2024-10-08 DIAGNOSIS — Z30.41 ENCOUNTER FOR SURVEILLANCE OF CONTRACEPTIVE PILLS: Primary | ICD-10-CM

## 2024-10-08 RX ORDER — NORGESTIMATE AND ETHINYL ESTRADIOL 0.25-0.035
1 KIT ORAL DAILY
Qty: 84 TABLET | Refills: 3 | Status: SHIPPED | OUTPATIENT
Start: 2024-10-08

## 2024-10-08 NOTE — PROGRESS NOTES
Patient requested refill of Rosalva.  Electronic prescription for 3 packs refill 3 was sent to local pharmacy.

## 2025-08-13 ENCOUNTER — ANNUAL EXAM (OUTPATIENT)
Dept: GYNECOLOGY | Facility: CLINIC | Age: 35
End: 2025-08-13
Payer: COMMERCIAL

## 2025-08-13 PROCEDURE — G0145 SCR C/V CYTO,THINLAYER,RESCR: HCPCS | Performed by: SPECIALIST
